# Patient Record
Sex: FEMALE | Race: WHITE | NOT HISPANIC OR LATINO | Employment: OTHER | URBAN - METROPOLITAN AREA
[De-identification: names, ages, dates, MRNs, and addresses within clinical notes are randomized per-mention and may not be internally consistent; named-entity substitution may affect disease eponyms.]

---

## 2021-01-21 ENCOUNTER — IMMUNIZATIONS (OUTPATIENT)
Dept: FAMILY MEDICINE CLINIC | Facility: HOSPITAL | Age: 76
End: 2021-01-21

## 2021-01-21 DIAGNOSIS — Z23 ENCOUNTER FOR IMMUNIZATION: Primary | ICD-10-CM

## 2021-01-21 PROCEDURE — 91301 SARS-COV-2 / COVID-19 MRNA VACCINE (MODERNA) 100 MCG: CPT

## 2021-01-21 PROCEDURE — 0011A SARS-COV-2 / COVID-19 MRNA VACCINE (MODERNA) 100 MCG: CPT

## 2021-02-17 ENCOUNTER — IMMUNIZATIONS (OUTPATIENT)
Dept: FAMILY MEDICINE CLINIC | Facility: HOSPITAL | Age: 76
End: 2021-02-17

## 2021-02-17 DIAGNOSIS — Z23 ENCOUNTER FOR IMMUNIZATION: Primary | ICD-10-CM

## 2021-02-17 PROCEDURE — 91301 SARS-COV-2 / COVID-19 MRNA VACCINE (MODERNA) 100 MCG: CPT

## 2021-02-17 PROCEDURE — 0012A SARS-COV-2 / COVID-19 MRNA VACCINE (MODERNA) 100 MCG: CPT

## 2021-08-03 ENCOUNTER — OFFICE VISIT (OUTPATIENT)
Dept: FAMILY MEDICINE CLINIC | Facility: CLINIC | Age: 76
End: 2021-08-03
Payer: MEDICARE

## 2021-08-03 VITALS
HEART RATE: 88 BPM | TEMPERATURE: 97.8 F | BODY MASS INDEX: 20.66 KG/M2 | RESPIRATION RATE: 20 BRPM | SYSTOLIC BLOOD PRESSURE: 138 MMHG | OXYGEN SATURATION: 98 % | WEIGHT: 121 LBS | DIASTOLIC BLOOD PRESSURE: 88 MMHG | HEIGHT: 64 IN

## 2021-08-03 DIAGNOSIS — R03.0 ELEVATED BLOOD-PRESSURE READING WITHOUT DIAGNOSIS OF HYPERTENSION: ICD-10-CM

## 2021-08-03 DIAGNOSIS — Z13.6 SCREENING FOR CARDIOVASCULAR CONDITION: ICD-10-CM

## 2021-08-03 DIAGNOSIS — H54.62 VISION LOSS OF LEFT EYE: Primary | ICD-10-CM

## 2021-08-03 DIAGNOSIS — D22.9 ATYPICAL NEVUS: ICD-10-CM

## 2021-08-03 PROCEDURE — 99204 OFFICE O/P NEW MOD 45 MIN: CPT | Performed by: INTERNAL MEDICINE

## 2021-08-03 NOTE — PROGRESS NOTES
[facility]        NAME: Maxi Levy is a 67 y o  male  : 1945    MRN: [id number]  DATE: August 3, 2021  TIME: 1:43 PM    Assessment and Plan   1  Vision loss of left eye  -difficult to visualize fundi due to pupillary constriction, she does have visual field deficit in left eye, nasal field  Otherwise normal neurological exam  Recommend she sees Ophtho ASAP for dilated eye exam    -     Ambulatory referral to Ophthalmology; Future    2  Elevated blood-pressure reading without diagnosis of hypertension  -BP a little high today, she denies prior history of HTN but hasn't not seen PCP in many years  -    CBC and differential; Future  -     Comprehensive metabolic panel; Future    3  Screening for cardiovascular condition  -     Lipid panel; Future    4  Atypical nevus  - mole on nose has been biopsied and removed in the past, but hasn't seen dermatology in 3 years  There is some areas of color irregularity, recommend she follows up with Dermatologist           Tobacco Cessation Counseling: Tobacco cessation counseling was provided  The patient is sincerely urged to quit consumption of tobacco  He is not ready to quit tobacco        Chief Complaint     Chief Complaint   Patient presents with   [address] issue did get dizzy,         History of Present Illness       Here to establish care  Hasn't seen PCP in years  Main concern is her vision  Reports "hole or black spot" visual field of left eye  No eye pain or photophobia  Started about 2 years ago  Has never had colon cancer screening, last mammogram several years ago  Sister  from brain cancer  Social hx: current smoker, < 0 5 ppd, previously ~ 0 5 ppd for 40 years  Cares for her  who has cancer  Review of Systems   Review of Systems   Constitutional: Negative for chills, fatigue, fever and unexpected weight change  HENT: Positive for hearing loss   Negative for congestion, postnasal drip, rhinorrhea, sore throat and trouble swallowing  Eyes: Positive for visual disturbance  Negative for photophobia, pain, redness and itching  Respiratory: Negative for cough, chest tightness, shortness of breath and wheezing  Cardiovascular: Negative for chest pain, palpitations and leg swelling  Gastrointestinal: Negative for abdominal pain, blood in stool, constipation, diarrhea, nausea and vomiting  Endocrine: Negative for cold intolerance, heat intolerance, polydipsia and polyuria  Genitourinary: Negative for dysuria, frequency, hematuria and urgency  Musculoskeletal: Negative for arthralgias, back pain and joint swelling  Skin: Negative for rash  Neurological: Negative for dizziness, tremors, speech difficulty, weakness, light-headedness, numbness and headaches  Psychiatric/Behavioral: Negative for confusion, dysphoric mood, hallucinations and suicidal ideas  The patient is not nervous/anxious  Current Medications     No current outpatient medications on file  Current Allergies     Allergies as of 08/03/2021    (No Known Allergies)            The following portions of the patient's history were reviewed and updated as appropriate: allergies, current medications, past family history, past medical history, past social history, past surgical history and problem list      History reviewed  No pertinent past medical history  Past Surgical History:   Procedure Laterality Date    APPENDECTOMY      CHOLECYSTECTOMY         Family History   Problem Relation Age of Onset    Diabetes Sister     Cancer Sister         brain cancer         Medications have been verified  Objective   /88   Pulse 88   Temp 97 8 °F (36 6 °C) (Tympanic)   Resp 20   Ht 5' 4" (1 626 m)   Wt 54 9 kg (121 lb)   SpO2 98%   BMI 20 77 kg/m²        Physical Exam     Physical Exam  Vitals reviewed  Constitutional:       General: He is not in acute distress       Appearance: He is well-developed and normal weight  HENT:      Head: Normocephalic and atraumatic  Right Ear: Ear canal and external ear normal  There is impacted cerumen  Left Ear: Ear canal and external ear normal  There is impacted cerumen  Mouth/Throat:      Pharynx: No oropharyngeal exudate or posterior oropharyngeal erythema  Eyes:      General: Visual field deficit present  No scleral icterus  Extraocular Movements: Extraocular movements intact  Conjunctiva/sclera: Conjunctivae normal       Pupils: Pupils are equal, round, and reactive to light  Funduscopic exam:     Right eye: No hemorrhage or papilledema  Visual Fields: Right eye visual fields normal       Comments: Difficulty visualizing left fundi   Neck:      Thyroid: No thyromegaly  Vascular: No carotid bruit  Cardiovascular:      Rate and Rhythm: Normal rate and regular rhythm  Pulses:           Dorsalis pedis pulses are 2+ on the right side and 2+ on the left side  Posterior tibial pulses are 2+ on the right side and 2+ on the left side  Heart sounds: Murmur heard  No friction rub  No gallop  Comments: Grade II/VI JONY at [employer]  Pulmonary:      Effort: Pulmonary effort is normal  No respiratory distress  Breath sounds: Normal breath sounds  No wheezing, rhonchi or rales  Chest:      Chest wall: No tenderness  Abdominal:      General: Abdomen is flat  A surgical scar is present  Bowel sounds are normal  There is no distension  Palpations: Abdomen is soft  There is no mass  Tenderness: There is no abdominal tenderness  There is no guarding or rebound  Hernia: No hernia is present  Musculoskeletal:         General: No tenderness or deformity  Normal range of motion  Cervical back: Normal range of motion and neck supple  Right lower leg: No edema  Left lower leg: No edema     Feet:      Right foot:      Skin integrity: No ulcer, skin breakdown, erythema, warmth, callus or dry skin  Left foot:      Skin integrity: No ulcer, skin breakdown, erythema, warmth, callus or dry skin  Lymphadenopathy:      Cervical: No cervical adenopathy  Skin:     General: Skin is warm and dry  Capillary Refill: Capillary refill takes less than 2 seconds  Comments: Nevus of tip of nose with some discoloration, hyperpigmented areas   Neurological:      Mental Status: He is alert and oriented to person, place, and time  Cranial Nerves: No dysarthria or facial asymmetry  Motor: Motor function is intact  No weakness or tremor  Coordination: Coordination is intact  Gait: Gait is intact  Psychiatric:         Mood and Affect: Mood and affect normal          Speech: Speech normal          Behavior: Behavior normal  Behavior is cooperative  Thought Content:  Thought content normal          Judgment: Judgment normal              Results:  No results found for: SODIUM, K, CL, CO2, BUN, CREATININE, GLUC, CALCIUM    No results found for: HGBA1C    No results found for: WBC, HGB, HCT, MCV, PLT

## 2021-08-03 NOTE — PATIENT INSTRUCTIONS
Please see Dr Edna Barboza for vision loss in left eye  Please get blood work done  Please call dermatologist at [facility] for mole on your nose  Follow up in 6 months

## 2021-08-04 ENCOUNTER — APPOINTMENT (OUTPATIENT)
Dept: LAB | Facility: CLINIC | Age: 76
End: 2021-08-04
Payer: MEDICARE

## 2021-08-04 DIAGNOSIS — Z13.6 SCREENING FOR CARDIOVASCULAR CONDITION: ICD-10-CM

## 2021-08-04 DIAGNOSIS — R03.0 ELEVATED BLOOD-PRESSURE READING WITHOUT DIAGNOSIS OF HYPERTENSION: ICD-10-CM

## 2021-08-04 LAB
ALBUMIN SERPL BCP-MCNC: 3.7 G/DL (ref 3.5–5)
ALP SERPL-CCNC: 98 U/L (ref 46–116)
ALT SERPL W P-5'-P-CCNC: 14 U/L (ref 12–78)
ANION GAP SERPL CALCULATED.3IONS-SCNC: 5 MMOL/L (ref 4–13)
AST SERPL W P-5'-P-CCNC: 13 U/L (ref 5–45)
BASOPHILS # BLD AUTO: 0.04 THOUSANDS/ΜL (ref 0–0.1)
BASOPHILS NFR BLD AUTO: 1 % (ref 0–1)
BILIRUB SERPL-MCNC: 0.47 MG/DL (ref 0.2–1)
BUN SERPL-MCNC: 13 MG/DL (ref 5–25)
CALCIUM SERPL-MCNC: 9 MG/DL (ref 8.3–10.1)
CHLORIDE SERPL-SCNC: 104 MMOL/L (ref 100–108)
CHOLEST SERPL-MCNC: 226 MG/DL (ref 50–200)
CO2 SERPL-SCNC: 28 MMOL/L (ref 21–32)
CREAT SERPL-MCNC: 0.86 MG/DL (ref 0.6–1.3)
EOSINOPHIL # BLD AUTO: 0.17 THOUSAND/ΜL (ref 0–0.61)
EOSINOPHIL NFR BLD AUTO: 3 % (ref 0–6)
ERYTHROCYTE [DISTWIDTH] IN BLOOD BY AUTOMATED COUNT: 14.4 % (ref 11.6–15.1)
GFR SERPL CREATININE-BSD FRML MDRD: 84 ML/MIN/1.73SQ M
GLUCOSE P FAST SERPL-MCNC: 94 MG/DL (ref 65–99)
HCT VFR BLD AUTO: 40.2 % (ref 36.5–49.3)
HDLC SERPL-MCNC: 51 MG/DL
HGB BLD-MCNC: 12.5 G/DL (ref 12–17)
IMM GRANULOCYTES # BLD AUTO: 0.01 THOUSAND/UL (ref 0–0.2)
IMM GRANULOCYTES NFR BLD AUTO: 0 % (ref 0–2)
LDLC SERPL CALC-MCNC: 155 MG/DL (ref 0–100)
LYMPHOCYTES # BLD AUTO: 1.34 THOUSANDS/ΜL (ref 0.6–4.47)
LYMPHOCYTES NFR BLD AUTO: 22 % (ref 14–44)
MCH RBC QN AUTO: 28.3 PG (ref 26.8–34.3)
MCHC RBC AUTO-ENTMCNC: 31.1 G/DL (ref 31.4–37.4)
MCV RBC AUTO: 91 FL (ref 82–98)
MONOCYTES # BLD AUTO: 0.36 THOUSAND/ΜL (ref 0.17–1.22)
MONOCYTES NFR BLD AUTO: 6 % (ref 4–12)
NEUTROPHILS # BLD AUTO: 4.11 THOUSANDS/ΜL (ref 1.85–7.62)
NEUTS SEG NFR BLD AUTO: 68 % (ref 43–75)
NONHDLC SERPL-MCNC: 175 MG/DL
NRBC BLD AUTO-RTO: 0 /100 WBCS
PLATELET # BLD AUTO: 193 THOUSANDS/UL (ref 149–390)
PMV BLD AUTO: 10.5 FL (ref 8.9–12.7)
POTASSIUM SERPL-SCNC: 4.1 MMOL/L (ref 3.5–5.3)
PROT SERPL-MCNC: 7.6 G/DL (ref 6.4–8.2)
RBC # BLD AUTO: 4.42 MILLION/UL (ref 3.88–5.62)
SODIUM SERPL-SCNC: 137 MMOL/L (ref 136–145)
TRIGL SERPL-MCNC: 101 MG/DL
WBC # BLD AUTO: 6.03 THOUSAND/UL (ref 4.31–10.16)

## 2021-08-04 PROCEDURE — 85025 COMPLETE CBC W/AUTO DIFF WBC: CPT

## 2021-08-04 PROCEDURE — 80061 LIPID PANEL: CPT

## 2021-08-04 PROCEDURE — 36415 COLL VENOUS BLD VENIPUNCTURE: CPT

## 2021-08-04 PROCEDURE — 80053 COMPREHEN METABOLIC PANEL: CPT

## 2021-08-05 DIAGNOSIS — E78.00 ELEVATED CHOLESTEROL: Primary | ICD-10-CM

## 2021-08-06 RX ORDER — ATORVASTATIN CALCIUM 10 MG/1
10 TABLET, FILM COATED ORAL EVERY EVENING
Qty: 90 TABLET | Refills: 0 | Status: SHIPPED | OUTPATIENT
Start: 2021-08-06 | End: 2021-09-30 | Stop reason: SDUPTHER

## 2021-09-16 ENCOUNTER — APPOINTMENT (OUTPATIENT)
Dept: LAB | Facility: CLINIC | Age: 76
End: 2021-09-16
Payer: MEDICARE

## 2021-09-16 ENCOUNTER — OFFICE VISIT (OUTPATIENT)
Dept: FAMILY MEDICINE CLINIC | Facility: CLINIC | Age: 76
End: 2021-09-16
Payer: MEDICARE

## 2021-09-16 VITALS
DIASTOLIC BLOOD PRESSURE: 86 MMHG | SYSTOLIC BLOOD PRESSURE: 134 MMHG | TEMPERATURE: 98.2 F | HEIGHT: 64 IN | WEIGHT: 121 LBS | RESPIRATION RATE: 20 BRPM | HEART RATE: 79 BPM | OXYGEN SATURATION: 98 % | BODY MASS INDEX: 20.66 KG/M2

## 2021-09-16 DIAGNOSIS — R68.89 COLD INTOLERANCE: ICD-10-CM

## 2021-09-16 DIAGNOSIS — R26.2 AMBULATORY DYSFUNCTION: ICD-10-CM

## 2021-09-16 DIAGNOSIS — R41.3 MEMORY LOSS: ICD-10-CM

## 2021-09-16 DIAGNOSIS — E55.9 VITAMIN D DEFICIENCY, UNSPECIFIED: ICD-10-CM

## 2021-09-16 DIAGNOSIS — R41.3 MEMORY LOSS: Primary | ICD-10-CM

## 2021-09-16 LAB
25(OH)D3 SERPL-MCNC: 11.5 NG/ML (ref 30–100)
T4 FREE SERPL-MCNC: 0.83 NG/DL (ref 0.76–1.46)
TSH SERPL DL<=0.05 MIU/L-ACNC: 8.46 UIU/ML (ref 0.36–3.74)
VIT B12 SERPL-MCNC: 416 PG/ML (ref 100–900)

## 2021-09-16 PROCEDURE — 84443 ASSAY THYROID STIM HORMONE: CPT

## 2021-09-16 PROCEDURE — 82607 VITAMIN B-12: CPT

## 2021-09-16 PROCEDURE — 99214 OFFICE O/P EST MOD 30 MIN: CPT | Performed by: INTERNAL MEDICINE

## 2021-09-16 PROCEDURE — 84439 ASSAY OF FREE THYROXINE: CPT

## 2021-09-16 PROCEDURE — 82306 VITAMIN D 25 HYDROXY: CPT

## 2021-09-16 PROCEDURE — 36415 COLL VENOUS BLD VENIPUNCTURE: CPT

## 2021-09-16 NOTE — PROGRESS NOTES
[facility]        NAME: Wilmer Mcgrath is a 67 y o  female  : 1945    MRN: [id number]  DATE: 2021  TIME: 7:30 PM    Assessment and Plan   1  Memory loss  -MMSE 26 today, she struggled with 3 item recall, differential includes dementia vs MCI  Will check labs, brain MRI due to unsteady gait  Discussed trial of donepezil, follow up 1 month  -  TSH, 3rd generation with Free T4 reflex; Future  -     Vitamin D 25 hydroxy; Future  -     Vitamin B12; Future  -     MRI brain wo contrast; Future; Expected date: 2021    2  Cold intolerance  -     TSH, 3rd generation with Free T4 reflex; Future    3  Vitamin D deficiency, unspecified   -     Vitamin D 25 hydroxy; Future    4  Ambulatory dysfunction  -     MRI brain wo contrast; Future; Expected date: 2021             Chief Complaint     Chief Complaint   Patient presents with    loss of balance     no falls/ dizziness and lightneadedness, blurry vision - did see eye doctor and got new glasses which she states is helping    Memory Loss     pt states she is getting forgetful         History of Present Illness       Here for acute visit due to confusion/memory loss   is with her today  Started about a year ago, but progressive  Often forgets where they park, but recently was shopping alone in grocery store and could not find certain items, checkout (she's been there several times before)  Niece is also concerned  Pt notices that she has trouble remembering things, feels bothered by it  Denies depression, anxiety, hallucinations but  reports that she often talks to herself  Denies headaches, tremors or dizziness but does feel off-balance at times, denies falls   manages finances, able to dress/bathe and feed self  Does housekeeping without difficulty  Review of Systems   Review of Systems   Constitutional: Positive for fatigue and unexpected weight change (20 lbs in past year)   Negative for appetite change, chills and fever  HENT: Negative for hearing loss and tinnitus  Eyes: Negative for visual disturbance  Gastrointestinal: Negative for abdominal pain, constipation, diarrhea, nausea and vomiting  Endocrine: Positive for cold intolerance  Genitourinary: Negative for difficulty urinating  Neurological: Positive for dizziness (feels off balance)  Negative for tremors, weakness, light-headedness, numbness and headaches  Psychiatric/Behavioral: Negative for hallucinations and sleep disturbance  The patient is not nervous/anxious  Current Medications       Current Outpatient Medications:     atorvastatin (LIPITOR) 10 mg tablet, Take 1 tablet (10 mg total) by mouth every evening, Disp: 90 tablet, Rfl: 0    ergocalciferol (VITAMIN D2) 50,000 units, Take 1 capsule (50,000 Units total) by mouth once a week, Disp: 12 capsule, Rfl: 1    levothyroxine 50 mcg tablet, Take 1 tablet (50 mcg total) by mouth daily in the early morning, Disp: 30 tablet, Rfl: 1    Current Allergies     Allergies as of 09/16/2021    (No Known Allergies)            The following portions of the patient's history were reviewed and updated as appropriate: allergies, current medications, past family history, past medical history, past social history, past surgical history and problem list      History reviewed  No pertinent past medical history  Past Surgical History:   Procedure Laterality Date    APPENDECTOMY      CHOLECYSTECTOMY         Family History   Problem Relation Age of Onset    Diabetes Sister     Cancer Sister         brain cancer         Medications have been verified  Objective   /86   Pulse 79   Temp 98 2 °F (36 8 °C)   Resp 20   Ht 5' 4" (1 626 m)   Wt 54 9 kg (121 lb)   SpO2 98%   BMI 20 77 kg/m²        Physical Exam     Physical Exam  Vitals reviewed  Constitutional:       General: She is not in acute distress  Appearance: She is normal weight     Neck: Thyroid: No thyromegaly  Cardiovascular:      Rate and Rhythm: Normal rate and regular rhythm  Heart sounds: S1 normal and S2 normal  Murmur heard  No friction rub  No gallop  Pulmonary:      Effort: Pulmonary effort is normal  No accessory muscle usage  Breath sounds: Normal breath sounds  No wheezing, rhonchi or rales  Musculoskeletal:      Right lower leg: No edema  Left lower leg: No edema  Neurological:      Mental Status: She is alert and oriented to person, place, and time  Motor: Motor function is intact  No weakness, tremor or abnormal muscle tone  Coordination: Romberg sign negative  Gait: Tandem walk abnormal    Psychiatric:         Mood and Affect: Mood and affect normal          Speech: Speech normal          Behavior: Behavior normal  Behavior is cooperative  Thought Content:  Thought content normal          Judgment: Judgment normal       Comments: MMSE 26/30               Results:  Lab Results   Component Value Date    SODIUM 137 08/04/2021    K 4 1 08/04/2021     08/04/2021    CO2 28 08/04/2021    BUN 13 08/04/2021    CREATININE 0 86 08/04/2021    CALCIUM 9 0 08/04/2021       No results found for: HGBA1C    Lab Results   Component Value Date    WBC 6 03 08/04/2021    HGB 12 5 08/04/2021    HCT 40 2 08/04/2021    MCV 91 08/04/2021     08/04/2021

## 2021-09-16 NOTE — PATIENT INSTRUCTIONS
Please get blood work done and MRI brain  If normal, we will start donepezil for possible dementia  Follow up in 1 month to re-assess

## 2021-09-17 DIAGNOSIS — E03.9 HYPOTHYROIDISM, UNSPECIFIED TYPE: Primary | ICD-10-CM

## 2021-09-17 DIAGNOSIS — E55.9 VITAMIN D DEFICIENCY: ICD-10-CM

## 2021-09-17 RX ORDER — ERGOCALCIFEROL 1.25 MG/1
50000 CAPSULE ORAL WEEKLY
Qty: 12 CAPSULE | Refills: 1 | Status: SHIPPED | OUTPATIENT
Start: 2021-09-17 | End: 2022-02-14 | Stop reason: SDUPTHER

## 2021-09-17 RX ORDER — LEVOTHYROXINE SODIUM 0.05 MG/1
50 TABLET ORAL
Qty: 30 TABLET | Refills: 1 | Status: SHIPPED | OUTPATIENT
Start: 2021-09-17 | End: 2021-11-11 | Stop reason: SDUPTHER

## 2021-09-27 ENCOUNTER — HOSPITAL ENCOUNTER (OUTPATIENT)
Dept: MRI IMAGING | Facility: HOSPITAL | Age: 76
Discharge: HOME | End: 2021-09-27
Attending: INTERNAL MEDICINE
Payer: MEDICARE

## 2021-09-27 DIAGNOSIS — R26.2 AMBULATORY DYSFUNCTION: ICD-10-CM

## 2021-09-27 DIAGNOSIS — R41.3 MEMORY LOSS: ICD-10-CM

## 2021-09-27 PROCEDURE — G1004 CDSM NDSC: HCPCS

## 2021-09-27 PROCEDURE — 70551 MRI BRAIN STEM W/O DYE: CPT

## 2021-09-30 ENCOUNTER — TELEPHONE (OUTPATIENT)
Dept: NEUROLOGY | Facility: CLINIC | Age: 76
End: 2021-09-30

## 2021-09-30 NOTE — TELEPHONE ENCOUNTER
Minnie Cooley had [facility] asking pt if she is okay coming in at 1pm instead  She called back to say that is okay  Removed NM appt notes for change  Gave directions and address

## 2021-09-30 NOTE — TELEPHONE ENCOUNTER
Scheduled via Paul Szymanski at [facility] office    Best contact number for patient:     verified by PCP        Emergency Contact name and number:    Referring provider and telephone number:     PCP    Primary Care Provider Name and if affiliated with [facility]:        Nolvia Juanito    Reason for Appointment/Dx:  Cerebrovascular accident (CVA), unspecified mechanism ([facility] )  R41 3 (ICD-10-CM) - Memory loss  R26 2 (ICD-10-CM) - Ambulatory dysfunction    Have you seen and followed up with a pediatric Neurologist for this disease in the past?      NO(If yes ok to schedule with Dr Stevan Escalante)    Neurology Location patient would like to be seen:    Order received? Yes                                              Records Received? PCP notes    Have you ever seen another Neurologist?       Not sure    Insurance Information    Insurance Name:    ID/Policy #:    Secondary Insurance:    ID/Policy#: Workman's Comp/ Accident/ School  Information      Workman's Comp/Accident/School related?        NO    If yes name of Insurance company:    Claim #:    Date of Injury:    Type of Injury:     Name and Telephone Number:    Notes:                   Appointment date:   TOMORROW for ASAP ref  Friday  10/1/21  [facility]

## 2021-10-01 ENCOUNTER — APPOINTMENT (OUTPATIENT)
Dept: LAB | Facility: CLINIC | Age: 76
End: 2021-10-01
Payer: MEDICARE

## 2021-10-01 ENCOUNTER — CONSULT (OUTPATIENT)
Dept: NEUROLOGY | Facility: CLINIC | Age: 76
End: 2021-10-01
Payer: MEDICARE

## 2021-10-01 ENCOUNTER — TELEPHONE (OUTPATIENT)
Dept: NEUROLOGY | Facility: CLINIC | Age: 76
End: 2021-10-01

## 2021-10-01 VITALS
BODY MASS INDEX: 21.04 KG/M2 | HEART RATE: 82 BPM | WEIGHT: 122.6 LBS | SYSTOLIC BLOOD PRESSURE: 180 MMHG | DIASTOLIC BLOOD PRESSURE: 90 MMHG

## 2021-10-01 DIAGNOSIS — Z86.73 HISTORY OF STROKE: ICD-10-CM

## 2021-10-01 DIAGNOSIS — R41.3 MEMORY LOSS: ICD-10-CM

## 2021-10-01 DIAGNOSIS — R26.2 AMBULATORY DYSFUNCTION: ICD-10-CM

## 2021-10-01 DIAGNOSIS — Z86.73 HISTORY OF STROKE: Primary | ICD-10-CM

## 2021-10-01 PROBLEM — H53.462: Status: ACTIVE | Noted: 2021-10-01

## 2021-10-01 PROBLEM — I63.9 CEREBROVASCULAR ACCIDENT (CVA) (HCC): Status: ACTIVE | Noted: 2021-10-01

## 2021-10-01 LAB
BUN SERPL-MCNC: 14 MG/DL (ref 5–25)
CREAT SERPL-MCNC: 0.89 MG/DL (ref 0.6–1.3)
GFR SERPL CREATININE-BSD FRML MDRD: 63 ML/MIN/1.73SQ M

## 2021-10-01 PROCEDURE — 99213 OFFICE O/P EST LOW 20 MIN: CPT | Performed by: PSYCHIATRY & NEUROLOGY

## 2021-10-01 PROCEDURE — 84520 ASSAY OF UREA NITROGEN: CPT

## 2021-10-01 PROCEDURE — 82565 ASSAY OF CREATININE: CPT

## 2021-10-01 PROCEDURE — 36415 COLL VENOUS BLD VENIPUNCTURE: CPT

## 2021-10-01 NOTE — ASSESSMENT & PLAN NOTE
Pt reports trouble with remembering where she places things, this appears to be more along the lines of a distractibility issue   MMSE at PCP recently was 26/30, normal

## 2021-10-01 NOTE — PROGRESS NOTES
Patient ID: Irving Shen is a 67 y o  female  Assessment/Plan:    Quadrant anopia, left  Pt reports blurry vision in left eye, exam found to have a left inferior nasal quadrant anopia  MRI showed an old infarct within the left posterior lateral temporal lobe and lateral occipital lobe which is the most likely cause  Memory loss  Pt reports trouble with remembering where she places things, this appears to be more along the lines of a distractibility issue  MMSE at [facility] recently was 26/30, normal     Ambulatory dysfunction  Pt denied any ambulatory issues today on exam     Cerebrovascular accident (CVA) [facility])  In summary, Irving Shen is a 67 y o   female with a history of HLD, and suspected HTN who presented with abnormal MRI findings of an old stroke and has an exam notable for left inferior nasal quadrant anopia  MRI 9/27/2021 revealed an "old infarct within the left posterior lateral temporal lobe and lateral occipital lobe  There is mild hyperintense diffusion signal seen within the peripheral aspect of this old infarct, some of which appears restricted which may represent acute to subacute price-infarct ischemia " Lipid Profile 8/4/2021: Total Cholesterol 226 / Triglycerides 101 / HDL 51 /   - The patient has significant risk factors for stroke including HTN and HLD  - The cause of her stroke seen on MRI is not clear, could be due to HTN, or atherosclerotic vs emboli, will need to obtain further work up to determine a possible cause  - Will obtain CTA  - Continue ASA and Atorvastatin        Diagnoses and all orders for this visit:    History of stroke  -     Ambulatory referral to Neurology  -     CTA head and neck w wo contrast; Future  -     Creatinine, serum;  Future  -     BUN; Future    Memory loss  -     Ambulatory referral to Neurology    Ambulatory dysfunction  -     Ambulatory referral to Neurology           Subjective:    Irving Shen is a 67 y o  female with PMHx of HTN, and hypothyroidism, who presents today at the request of her PCP following an MRI on Monday that showed a stroke that was initially obtained due to concerns for Dementia  Specifically she reports she has felt unsteadiness  She reports some memory and cognitive issues such as not remembering where she placed something which is a newer issue for her in the last 3 or so month  Does report some visual changes that started 6-8 months ago  Saw Dr Luis F dominguez in [place]  The patient does not drive anymore as her  took away her keys because of her vision issues  In her left eye, she has blurriness,     The following portions of the patient's history were reviewed and updated as appropriate: allergies, current medications, past family history, past medical history, past social history, past surgical history and problem list     Objective:    Blood pressure (!) 180/90, pulse 82, weight 55 6 kg (122 lb 9 6 oz)  Physical Exam  Vitals and nursing note reviewed  Constitutional:       General: She is not in acute distress  Appearance: She is not ill-appearing, toxic-appearing or diaphoretic  HENT:      Head: Normocephalic and atraumatic  Nose: Nose normal       Mouth/Throat:      Mouth: Mucous membranes are moist       Pharynx: Oropharynx is clear  No oropharyngeal exudate  Eyes:      General: Lids are normal  No scleral icterus  Right eye: No discharge  Left eye: No discharge  Extraocular Movements: Extraocular movements intact  Pupils: Pupils are equal, round, and reactive to light  Cardiovascular:      Rate and Rhythm: Normal rate  Pulses: Normal pulses  Pulmonary:      Effort: Pulmonary effort is normal    Abdominal:      General: Abdomen is flat  Musculoskeletal:         General: No swelling or deformity  Skin:     General: Skin is warm and dry  Neurological:      Mental Status: She is alert     Psychiatric:         Mood and Affect: Mood normal          Speech: Speech normal          Behavior: Behavior normal          Neurological Exam  Mental Status  Alert  Recent and remote memory are intact  Speech is normal  Language is fluent with no aphasia  Attention and concentration are normal     Cranial Nerves  CN II: Visual acuity is normal  Left inferior quadrantanopsia  Inferior nasal quadrantanopsia  CN III, IV, VI: Extraocular movements intact bilaterally  Normal lids and orbits bilaterally  Pupils equal round and reactive to light bilaterally  CN V: Facial sensation is normal   CN VII: Full and symmetric facial movement  CN VIII: Hearing is normal   CN IX, X: Palate elevates symmetrically  Normal gag reflex  CN XI: Shoulder shrug strength is normal   CN XII: Tongue midline without atrophy or fasciculations  Motor  Normal muscle bulk throughout  Normal muscle tone  No abnormal involuntary movements  Strength is 5/5 in all four extremities except as noted  Sensory  Sensation is intact to light touch, pinprick, vibration and proprioception in all four extremities  Reflexes  Deep tendon reflexes are 2+ and symmetric except as noted  Coordination  Right: Finger-to-nose normal  Heel-to-shin normal   Left: Finger-to-nose normal  Heel-to-shin normal     ROS reviewed and as per below  ROS:    Review of Systems   Constitutional: Negative  Negative for appetite change and fever  HENT: Negative  Negative for hearing loss, tinnitus, trouble swallowing and voice change  Eyes: Negative  Negative for photophobia and pain  Respiratory: Negative  Negative for shortness of breath  Cardiovascular: Negative  Negative for palpitations  Gastrointestinal: Negative  Negative for nausea and vomiting  Endocrine: Negative  Negative for cold intolerance  Genitourinary: Negative  Negative for dysuria, frequency and urgency  Musculoskeletal: Negative  Negative for myalgias and neck pain  Skin: Negative  Negative for rash  Neurological: Negative  Negative for dizziness, tremors, seizures, syncope, facial asymmetry, speech difficulty, weakness, light-headedness, numbness and headaches  Hematological: Negative  Does not bruise/bleed easily  Psychiatric/Behavioral: Positive for confusion  Negative for hallucinations and sleep disturbance  Memory loss   All other systems reviewed and are negative

## 2021-10-01 NOTE — ASSESSMENT & PLAN NOTE
Pt reports blurry vision in left eye, exam found to have a left inferior nasal quadrant anopia  MRI showed an old infarct within the left posterior lateral temporal lobe and lateral occipital lobe which is the most likely cause

## 2021-10-01 NOTE — PATIENT INSTRUCTIONS
- Please obtain the CT (brain imaging) of your head  - Please monitor your blood pressure and keep a record of these readings  - Please follow up with your primary care provider regarding your blood pressure, if it frequently higher then 130/80 please call your PCP

## 2021-10-01 NOTE — ASSESSMENT & PLAN NOTE
In summary, Geni Egan is a 67 y o   female with a history of HLD, and suspected HTN who presented with abnormal MRI findings of an old stroke and has an exam notable for left inferior nasal quadrant anopia  MRI 9/27/2021 revealed an "old infarct within the left posterior lateral temporal lobe and lateral occipital lobe  There is mild hyperintense diffusion signal seen within the peripheral aspect of this old infarct, some of which appears restricted which may represent acute to subacute price-infarct ischemia " Lipid Profile 8/4/2021: Total Cholesterol 226 / Triglycerides 101 / HDL 51 /   - The patient has significant risk factors for stroke including HTN and HLD  - The cause of her stroke seen on MRI is not clear, could be due to HTN, or atherosclerotic vs emboli, will need to obtain further work up to determine a possible cause     - Will obtain CTA  - Continue ASA and Atorvastatin

## 2021-10-05 ENCOUNTER — TELEPHONE (OUTPATIENT)
Dept: FAMILY MEDICINE CLINIC | Facility: CLINIC | Age: 76
End: 2021-10-05

## 2021-10-06 ENCOUNTER — CLINICAL SUPPORT (OUTPATIENT)
Dept: FAMILY MEDICINE CLINIC | Facility: CLINIC | Age: 76
End: 2021-10-06
Payer: MEDICARE

## 2021-10-06 ENCOUNTER — TELEPHONE (OUTPATIENT)
Dept: FAMILY MEDICINE CLINIC | Facility: CLINIC | Age: 76
End: 2021-10-06

## 2021-10-06 VITALS
OXYGEN SATURATION: 96 % | SYSTOLIC BLOOD PRESSURE: 186 MMHG | RESPIRATION RATE: 20 BRPM | DIASTOLIC BLOOD PRESSURE: 84 MMHG | HEART RATE: 84 BPM

## 2021-10-06 DIAGNOSIS — I10 PRIMARY HYPERTENSION: Primary | ICD-10-CM

## 2021-10-06 PROCEDURE — 99211 OFF/OP EST MAY X REQ PHY/QHP: CPT

## 2021-10-06 RX ORDER — AMLODIPINE BESYLATE 5 MG/1
5 TABLET ORAL DAILY
Qty: 30 TABLET | Refills: 1 | Status: SHIPPED | OUTPATIENT
Start: 2021-10-06 | End: 2021-11-16 | Stop reason: SDUPTHER

## 2021-10-08 ENCOUNTER — HOSPITAL ENCOUNTER (OUTPATIENT)
Dept: CT IMAGING | Facility: HOSPITAL | Age: 76
Discharge: HOME | End: 2021-10-08
Attending: PSYCHIATRY & NEUROLOGY
Payer: MEDICARE

## 2021-10-08 DIAGNOSIS — Z86.73 HISTORY OF STROKE: ICD-10-CM

## 2021-10-08 PROCEDURE — 70498 CT ANGIOGRAPHY NECK: CPT

## 2021-10-08 PROCEDURE — 70496 CT ANGIOGRAPHY HEAD: CPT

## 2021-10-08 RX ADMIN — IOHEXOL 100 ML: 350 INJECTION, SOLUTION INTRAVENOUS at 15:10

## 2021-10-14 ENCOUNTER — HOSPITAL ENCOUNTER (OUTPATIENT)
Dept: NON INVASIVE DIAGNOSTICS | Facility: HOSPITAL | Age: 76
Discharge: HOME | End: 2021-10-14
Attending: INTERNAL MEDICINE
Payer: MEDICARE

## 2021-10-14 VITALS
DIASTOLIC BLOOD PRESSURE: 84 MMHG | WEIGHT: 122 LBS | HEIGHT: 64 IN | SYSTOLIC BLOOD PRESSURE: 186 MMHG | BODY MASS INDEX: 20.83 KG/M2

## 2021-10-14 DIAGNOSIS — I67.2 CEREBRAL ATHEROSCLEROSIS: ICD-10-CM

## 2021-10-14 DIAGNOSIS — I63.9 CEREBROVASCULAR ACCIDENT (CVA), UNSPECIFIED MECHANISM (HCC): ICD-10-CM

## 2021-10-14 LAB
AORTIC ROOT: 3.3 CM
AORTIC VALVE MEAN VELOCITY: 11.3 M/S
APICAL FOUR CHAMBER EJECTION FRACTION: 65 %
ASCENDING AORTA: 2.8 CM
AV LVOT MEAN GRADIENT: 2 MMHG
AV LVOT PEAK GRADIENT: 4 MMHG
AV MEAN GRADIENT: 6 MMHG
AV PEAK GRADIENT: 10 MMHG
DOP CALC AO VTI: 38.61 CM
DOP CALC LVOT PEAK VEL VTI: 19.65 CM
DOP CALC LVOT PEAK VEL: 0.95 M/S
E WAVE DECELERATION TIME: 233 MS
FRACTIONAL SHORTENING: 43 % (ref 28–44)
INTERVENTRICULAR SEPTUM IN DIASTOLE (PARASTERNAL SHORT AXIS VIEW): 0.8 CM
LAAS-AP4: 16.2 CM2
LEFT INTERNAL DIMENSION IN SYSTOLE: 2.5 CM (ref 2.1–4)
LEFT VENTRICULAR INTERNAL DIMENSION IN DIASTOLE: 4.4 CM (ref 3.71–5.52)
LEFT VENTRICULAR POSTERIOR WALL IN END DIASTOLE: 1 CM
LEFT VENTRICULAR STROKE VOLUME: 66 ML
LV EF: 73 %
MV E'TISSUE VEL-SEP: 5 CM/S
MV PEAK A VEL: 0.94 M/S
MV PEAK E VEL: 50 CM/S
MV STENOSIS PRESSURE HALF TIME: 0 MS
PA SYSTOLIC PRESSURE: 32 MMHG
RIGHT VENTRICLE ID DIMENSION: 2.9 CM
SL CV LV EF: 60
SL CV PED ECHO LEFT VENTRICLE DIASTOLIC VOLUME (MOD BIPLANE) 2D: 88 ML
SL CV PED ECHO LEFT VENTRICLE SYSTOLIC VOLUME (MOD BIPLANE) 2D: 22 ML
TR PEAK VELOCITY: 2.8 M/S
TRICUSPID VALVE PEAK REGURGITATION VELOCITY: 2.81 M/S
TRICUSPID VALVE S': 48 CM/S
TV PEAK GRADIENT: 32 MMHG
Z-SCORE OF LEFT VENTRICULAR DIMENSION IN END SYSTOLE: -0.28

## 2021-10-14 PROCEDURE — 93306 TTE W/DOPPLER COMPLETE: CPT

## 2021-10-14 PROCEDURE — 93306 TTE W/DOPPLER COMPLETE: CPT | Performed by: INTERNAL MEDICINE

## 2021-10-14 PROCEDURE — 93880 EXTRACRANIAL BILAT STUDY: CPT | Performed by: SURGERY

## 2021-10-14 PROCEDURE — 93880 EXTRACRANIAL BILAT STUDY: CPT

## 2021-10-18 ENCOUNTER — OFFICE VISIT (OUTPATIENT)
Dept: FAMILY MEDICINE CLINIC | Facility: CLINIC | Age: 76
End: 2021-10-18
Payer: MEDICARE

## 2021-10-18 VITALS
TEMPERATURE: 97.8 F | BODY MASS INDEX: 20.69 KG/M2 | SYSTOLIC BLOOD PRESSURE: 144 MMHG | HEART RATE: 67 BPM | RESPIRATION RATE: 18 BRPM | WEIGHT: 121.2 LBS | OXYGEN SATURATION: 99 % | HEIGHT: 64 IN | DIASTOLIC BLOOD PRESSURE: 80 MMHG

## 2021-10-18 DIAGNOSIS — I63.9 CEREBROVASCULAR ACCIDENT (CVA), UNSPECIFIED MECHANISM (HCC): ICD-10-CM

## 2021-10-18 DIAGNOSIS — E55.9 VITAMIN D DEFICIENCY: ICD-10-CM

## 2021-10-18 DIAGNOSIS — Z12.31 ENCOUNTER FOR SCREENING MAMMOGRAM FOR BREAST CANCER: ICD-10-CM

## 2021-10-18 DIAGNOSIS — E03.8 SUBCLINICAL HYPOTHYROIDISM: ICD-10-CM

## 2021-10-18 DIAGNOSIS — I10 PRIMARY HYPERTENSION: Primary | ICD-10-CM

## 2021-10-18 DIAGNOSIS — Z12.2 ENCOUNTER FOR SCREENING FOR LUNG CANCER: ICD-10-CM

## 2021-10-18 DIAGNOSIS — Z87.891 PERSONAL HISTORY OF NICOTINE DEPENDENCE: ICD-10-CM

## 2021-10-18 DIAGNOSIS — Z00.00 ENCOUNTER FOR ANNUAL WELLNESS VISIT (AWV) IN MEDICARE PATIENT: ICD-10-CM

## 2021-10-18 DIAGNOSIS — Z23 ENCOUNTER FOR IMMUNIZATION: ICD-10-CM

## 2021-10-18 PROBLEM — H54.62 VISION LOSS OF LEFT EYE: Status: RESOLVED | Noted: 2021-08-03 | Resolved: 2021-10-18

## 2021-10-18 PROCEDURE — 90662 IIV NO PRSV INCREASED AG IM: CPT

## 2021-10-18 PROCEDURE — 99214 OFFICE O/P EST MOD 30 MIN: CPT | Performed by: INTERNAL MEDICINE

## 2021-10-18 PROCEDURE — 1123F ACP DISCUSS/DSCN MKR DOCD: CPT

## 2021-10-18 PROCEDURE — G0008 ADMIN INFLUENZA VIRUS VAC: HCPCS

## 2021-10-18 PROCEDURE — G0438 PPPS, INITIAL VISIT: HCPCS | Performed by: INTERNAL MEDICINE

## 2021-10-22 ENCOUNTER — TELEPHONE (OUTPATIENT)
Dept: NEUROLOGY | Facility: CLINIC | Age: 76
End: 2021-10-22

## 2021-11-01 ENCOUNTER — CLINICAL SUPPORT (OUTPATIENT)
Dept: FAMILY MEDICINE CLINIC | Facility: CLINIC | Age: 76
End: 2021-11-01
Payer: MEDICARE

## 2021-11-01 VITALS
OXYGEN SATURATION: 99 % | SYSTOLIC BLOOD PRESSURE: 170 MMHG | RESPIRATION RATE: 18 BRPM | DIASTOLIC BLOOD PRESSURE: 84 MMHG | HEART RATE: 84 BPM

## 2021-11-01 DIAGNOSIS — I10 HYPERTENSION, UNSPECIFIED TYPE: Primary | ICD-10-CM

## 2021-11-01 DIAGNOSIS — I10 PRIMARY HYPERTENSION: Primary | ICD-10-CM

## 2021-11-01 PROCEDURE — 99211 OFF/OP EST MAY X REQ PHY/QHP: CPT

## 2021-11-01 RX ORDER — METOPROLOL SUCCINATE 25 MG/1
25 TABLET, EXTENDED RELEASE ORAL DAILY
Qty: 30 TABLET | Refills: 5 | Status: SHIPPED | OUTPATIENT
Start: 2021-11-01 | End: 2022-04-22 | Stop reason: SDUPTHER

## 2021-11-05 ENCOUNTER — HOSPITAL ENCOUNTER (OUTPATIENT)
Dept: CT IMAGING | Facility: HOSPITAL | Age: 76
Discharge: HOME | End: 2021-11-05
Attending: INTERNAL MEDICINE
Payer: MEDICARE

## 2021-11-05 DIAGNOSIS — Z87.891 PERSONAL HISTORY OF NICOTINE DEPENDENCE: ICD-10-CM

## 2021-11-05 DIAGNOSIS — Z12.2 ENCOUNTER FOR SCREENING FOR LUNG CANCER: ICD-10-CM

## 2021-11-05 PROCEDURE — 71271 CT THORAX LUNG CANCER SCR C-: CPT

## 2021-11-08 ENCOUNTER — IMMUNIZATIONS (OUTPATIENT)
Dept: FAMILY MEDICINE CLINIC | Facility: HOSPITAL | Age: 76
End: 2021-11-08

## 2021-11-08 DIAGNOSIS — Z23 ENCOUNTER FOR IMMUNIZATION: Primary | ICD-10-CM

## 2021-11-08 PROCEDURE — 0013A COVID-19 MODERNA VACC 0.25 ML BOOSTER: CPT

## 2021-11-08 PROCEDURE — 91306 COVID-19 MODERNA VACC 0.25 ML BOOSTER: CPT

## 2021-11-11 DIAGNOSIS — E03.9 HYPOTHYROIDISM, UNSPECIFIED TYPE: ICD-10-CM

## 2021-11-11 RX ORDER — LEVOTHYROXINE SODIUM 0.05 MG/1
50 TABLET ORAL
Qty: 30 TABLET | Refills: 5 | Status: SHIPPED | OUTPATIENT
Start: 2021-11-11 | End: 2022-05-02 | Stop reason: SDUPTHER

## 2021-11-16 ENCOUNTER — OFFICE VISIT (OUTPATIENT)
Dept: FAMILY MEDICINE CLINIC | Facility: CLINIC | Age: 76
End: 2021-11-16
Payer: MEDICARE

## 2021-11-16 VITALS
OXYGEN SATURATION: 98 % | DIASTOLIC BLOOD PRESSURE: 82 MMHG | HEIGHT: 64 IN | SYSTOLIC BLOOD PRESSURE: 150 MMHG | HEART RATE: 73 BPM | TEMPERATURE: 98 F | BODY MASS INDEX: 20.8 KG/M2

## 2021-11-16 DIAGNOSIS — I10 PRIMARY HYPERTENSION: ICD-10-CM

## 2021-11-16 PROCEDURE — 99213 OFFICE O/P EST LOW 20 MIN: CPT | Performed by: NURSE PRACTITIONER

## 2021-11-16 RX ORDER — AMLODIPINE BESYLATE 10 MG/1
10 TABLET ORAL DAILY
Qty: 30 TABLET | Refills: 2
Start: 2021-11-16 | End: 2022-02-21 | Stop reason: SDUPTHER

## 2021-11-22 ENCOUNTER — TELEPHONE (OUTPATIENT)
Dept: FAMILY MEDICINE CLINIC | Facility: CLINIC | Age: 76
End: 2021-11-22

## 2021-11-30 ENCOUNTER — CLINICAL SUPPORT (OUTPATIENT)
Dept: FAMILY MEDICINE CLINIC | Facility: CLINIC | Age: 76
End: 2021-11-30
Payer: MEDICARE

## 2021-11-30 VITALS — DIASTOLIC BLOOD PRESSURE: 62 MMHG | HEART RATE: 68 BPM | SYSTOLIC BLOOD PRESSURE: 118 MMHG | OXYGEN SATURATION: 95 %

## 2021-11-30 DIAGNOSIS — Z01.30 BP CHECK: Primary | ICD-10-CM

## 2021-11-30 PROCEDURE — 99211 OFF/OP EST MAY X REQ PHY/QHP: CPT

## 2022-01-28 ENCOUNTER — OFFICE VISIT (OUTPATIENT)
Dept: NEUROLOGY | Facility: CLINIC | Age: 77
End: 2022-01-28
Payer: MEDICARE

## 2022-01-28 VITALS
BODY MASS INDEX: 22.02 KG/M2 | WEIGHT: 129 LBS | TEMPERATURE: 97.1 F | SYSTOLIC BLOOD PRESSURE: 157 MMHG | DIASTOLIC BLOOD PRESSURE: 70 MMHG | HEART RATE: 79 BPM | HEIGHT: 64 IN

## 2022-01-28 DIAGNOSIS — I10 PRIMARY HYPERTENSION: ICD-10-CM

## 2022-01-28 DIAGNOSIS — Z86.73 HISTORY OF STROKE: Primary | ICD-10-CM

## 2022-01-28 PROCEDURE — 99213 OFFICE O/P EST LOW 20 MIN: CPT | Performed by: PSYCHIATRY & NEUROLOGY

## 2022-01-28 NOTE — ASSESSMENT & PLAN NOTE
Assessment:    Elissa Martines is a 67 y o   female with a history of HLD, and HTN who is seen today in Neurology clinic as a follow up for prior left sided temporal and occipital infracts  Since her last visit, patient is doing well  She denies any new stroke like symptoms  She continues to remain compliant with her medications  She denies any recent hospitalizations or bleeding  Plan:   · Recommend continuing with Asa 81 mg daily    · Continue with statin 40 mg for secondary stroke prevention  · Continue regular follow ups with PCP regarding Blood Pressure management  · Continue to avoid using NSAIDs for headaches or other pain and if needed would recommend to use Tylenol   · Recommend mediterranean diet & regular exercise regimen atleast 4-5 times a week for 20-30 minutes     · Patient counseled on recognizing the signs/symptoms of CVA  · Patient counseled on importance of medication compliance  · Patient verbalized understanding and all questions were addressed   · Follow up on as needed basis

## 2022-01-28 NOTE — PROGRESS NOTES
F/U stroke  No complaints    Feel good    Tired -> fall asleep, early waking    Appetite - good normal, gained     Mood good    Activity -> falls, off blance none  Next PCP on Monday

## 2022-01-28 NOTE — PROGRESS NOTES
Patient ID: Nahid Oconnor is a 67 y o  female  Assessment/Plan:    Cerebrovascular accident (CVA) [facility])  Assessment:    Nahid Oconnor is a 67 y o   female with a history of HLD, and HTN who is seen today in Neurology clinic as a follow up for prior left sided temporal and occipital infracts  Since her last visit, patient is doing well  She denies any new stroke like symptoms  She continues to remain compliant with her medications  She denies any recent hospitalizations or bleeding  Plan:   · Recommend continuing with Asa 81 mg daily    · Continue with statin 40 mg for secondary stroke prevention  · Continue regular follow ups with PCP regarding Blood Pressure management  · Continue to avoid using NSAIDs for headaches or other pain and if needed would recommend to use Tylenol   · Recommend mediterranean diet & regular exercise regimen atleast 4-5 times a week for 20-30 minutes  · Patient counseled on recognizing the signs/symptoms of CVA  · Patient counseled on importance of medication compliance  · Patient verbalized understanding and all questions were addressed   · Follow up on as needed basis            Diagnoses and all orders for this visit:    Cerebrovascular accident (CVA), unspecified mechanism ([facility] [address] )           Subjective:  Nahid Oconnor is a 67 y o  female is seen today in the neurology clinic as a follow up for prior history of stroke in the left temporal and left occipital region  Since her last visit patient has been doing well  She denies any new stroke like symptoms  New Neurological deficits  She continues to remain compliant with her medications  She does monitor her blood pressure and follows with her PCP  No recent falls, hospitalizations or head trauma  She denies any concerns of bleeding or changes to her medical history  She denies any issues with her mood, appetite or sleep recently      Have you had any new stroke like symptoms that were not previously present (Sudden loss of vision, difficulty speaking or swallowing,  vertigo/room spinning that did not quickly resolve, weakness or numbness affecting one side of the body)? no    Are you taking all of your medications as prescribed? Yes    Any side effects including bleeding/bruising? no      History reviewed  No pertinent past medical history      Social History     Socioeconomic History    Marital status: /Civil Union     Spouse name: None    Number of children: None    Years of education: None    Highest education level: None   Occupational History    None   Tobacco Use    Smoking status: Former Smoker     Packs/day: 0 50     Years: 40 00     Pack years: 20 00     Quit date: 2019     Years since quitting: 3 0    Smokeless tobacco: Never Used    Tobacco comment: Social    Vaping Use    Vaping Use: Never used   Substance and Sexual Activity    Alcohol use: Never    Drug use: Never    Sexual activity: None   Other Topics Concern    None   Social History Narrative    None     Social Determinants of Health     Financial Resource Strain: Not on file   Food Insecurity: Not on file   Transportation Needs: Not on file   Physical Activity: Not on file   Stress: Not on file   Social Connections: Not on file   Intimate Partner Violence: Not on file   Housing Stability: Not on file         Current Outpatient Medications:     aspirin (ECOTRIN LOW STRENGTH) 81 mg EC tablet, Take 1 tablet (81 mg total) by mouth daily, Disp: 90 tablet, Rfl: 1    atorvastatin (LIPITOR) 40 mg tablet, Take 1 tablet (40 mg total) by mouth every evening, Disp: 90 tablet, Rfl: 1    ergocalciferol (VITAMIN D2) 50,000 units, Take 1 capsule (50,000 Units total) by mouth once a week, Disp: 12 capsule, Rfl: 1    levothyroxine 50 mcg tablet, Take 1 tablet (50 mcg total) by mouth daily in the early morning, Disp: 30 tablet, Rfl: 5    metoprolol succinate (Toprol XL) 25 mg 24 hr tablet, Take 1 tablet (25 mg total) by mouth daily, Disp: 30 tablet, Rfl: 5   amLODIPine (NORVASC) 10 mg tablet, Take 1 tablet (10 mg total) by mouth daily (Patient taking differently: Take 5 mg by mouth daily  ), Disp: 30 tablet, Rfl: 2    No Known Allergies       The following portions of the patient's history were reviewed and updated as appropriate:   She  has no past medical history on file  She   Patient Active Problem List    Diagnosis Date Noted    Vitamin D deficiency 10/18/2021    Subclinical hypothyroidism 10/18/2021    Cerebrovascular accident (CVA) ([address] ) 10/01/2021    Memory loss 10/01/2021    Ambulatory dysfunction 10/01/2021    Quadrant anopia, 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    Atypical nevus 08/03/2021    Primary hypertension 08/03/2021     She  has a past surgical history that includes Cholecystectomy and Appendectomy  Her family history includes Cancer in her sister; Diabetes in her father and sister; Stroke in her mother  She  reports that she quit smoking about 3 years ago  She has a 20 00 pack-year smoking history  She has never used smokeless tobacco  She reports that she does not drink alcohol and does not use drugs  Current Outpatient Medications   Medication Sig Dispense Refill    aspirin (ECOTRIN LOW STRENGTH) 81 mg EC tablet Take 1 tablet (81 mg total) by mouth daily 90 tablet 1    atorvastatin (LIPITOR) 40 mg tablet Take 1 tablet (40 mg total) by mouth every evening 90 tablet 1    ergocalciferol (VITAMIN D2) 50,000 units Take 1 capsule (50,000 Units total) by mouth once a week 12 capsule 1    levothyroxine 50 mcg tablet Take 1 tablet (50 mcg total) by mouth daily in the early morning 30 tablet 5    metoprolol succinate (Toprol XL) 25 mg 24 hr tablet Take 1 tablet (25 mg total) by mouth daily 30 tablet 5    amLODIPine (NORVASC) 10 mg tablet Take 1 tablet (10 mg total) by mouth daily (Patient taking differently: Take 5 mg by mouth daily  ) 30 tablet 2     No current facility-administered medications for this visit       Current Outpatient Medications on File Prior to Visit   Medication Sig    aspirin (ECOTRIN LOW STRENGTH) 81 mg EC tablet Take 1 tablet (81 mg total) by mouth daily    atorvastatin (LIPITOR) 40 mg tablet Take 1 tablet (40 mg total) by mouth every evening    ergocalciferol (VITAMIN D2) 50,000 units Take 1 capsule (50,000 Units total) by mouth once a week    levothyroxine 50 mcg tablet Take 1 tablet (50 mcg total) by mouth daily in the early morning    metoprolol succinate (Toprol XL) 25 mg 24 hr tablet Take 1 tablet (25 mg total) by mouth daily    amLODIPine (NORVASC) 10 mg tablet Take 1 tablet (10 mg total) by mouth daily (Patient taking differently: Take 5 mg by mouth daily  )     No current facility-administered medications on file prior to visit  She has No Known Allergies            Objective:    Blood pressure 157/70, pulse 79, temperature (!) 97 1 °F (36 2 °C), temperature source Tympanic, height 5' 4" (1 626 m), weight 58 5 kg (129 lb)  Physical Exam  Vitals reviewed  Constitutional:       Appearance: Normal appearance  HENT:      Head: Normocephalic and atraumatic  Mouth/Throat:      Mouth: Mucous membranes are moist    Eyes:      General: Lids are normal       Extraocular Movements: Extraocular movements intact  Pupils: Pupils are equal, round, and reactive to light  Neurological:      Mental Status: She is alert  Coordination: Romberg sign negative  Psychiatric:         Speech: Speech normal          Neurological Exam  Mental Status  Alert  Oriented to person, place and time  Speech is normal  Language is fluent with no aphasia  Cranial Nerves  CN II: Visual fields full to confrontation  Right funduscopic exam: not visualized  Left funduscopic exam: not visualized  CN III, IV, VI: Extraocular movements intact bilaterally  Normal lids and orbits bilaterally  Pupils equal round and reactive to light bilaterally  CN V: Facial sensation is normal   CN VII: Full and symmetric facial movement    CN VIII: Hearing is normal   CN XI: Shoulder shrug strength is normal   CN XII: Tongue midline without atrophy or fasciculations  Motor  Normal muscle bulk throughout  No fasciculations present  Normal muscle tone  Strength is 5/5 in all four extremities except as noted  Right Extremity   Deltoids: 5/5  Biceps :5/5  Triceps: 5/5  : 5/5  Hip flexors: 5/5  Knee Ext :5/5  Knee Flex: 5/5  Dorsiflexion 5/5  Plantarflexion: 5/5    Left Extremity   Deltoids: 5/5  Biceps :5/5  Triceps: 5/5  : 5/5  Hip flexors: 5/5  Knee Ext :5/5  Knee Flex: 5/5  Dorsiflexion 5/5  Plantarflexion: 5/5    Sensory  Light touch is normal in upper and lower extremities  Pinprick is normal in upper and lower extremities  Reflexes  Deep tendon reflexes are 2+ and symmetric except as noted  Coordination  Right: Finger-to-nose normal   Left: Finger-to-nose normal     Gait  Casual gait is normal including stance, stride, and arm swing  Normal tandem gait  Romberg is absent  ROS:  I reviewed the below ROS and what is mentioned in HPI, the remainder of ROS was negative  Review of Systems   Constitutional: Negative  Negative for appetite change and fever  HENT: Negative  Negative for hearing loss, tinnitus, trouble swallowing and voice change  Eyes: Negative  Negative for photophobia and pain  Respiratory: Negative  Negative for shortness of breath  Cardiovascular: Negative  Negative for palpitations  Gastrointestinal: Negative  Negative for nausea and vomiting  Endocrine: Negative  Negative for cold intolerance  Genitourinary: Negative  Negative for dysuria, frequency and urgency  Musculoskeletal: Negative  Negative for myalgias and neck pain  Skin: Negative  Negative for rash  Neurological: Negative  Negative for dizziness, tremors, seizures, syncope, facial asymmetry, speech difficulty, weakness, light-headedness, numbness and headaches  Hematological: Bruises/bleeds easily     Psychiatric/Behavioral: Negative for confusion, hallucinations and sleep disturbance

## 2022-01-31 ENCOUNTER — HOSPITAL ENCOUNTER (EMERGENCY)
Facility: HOSPITAL | Age: 77
Discharge: HOME | End: 2022-01-31
Attending: EMERGENCY MEDICINE
Payer: MEDICARE

## 2022-01-31 ENCOUNTER — OFFICE VISIT (OUTPATIENT)
Dept: FAMILY MEDICINE CLINIC | Facility: CLINIC | Age: 77
End: 2022-01-31
Payer: MEDICARE

## 2022-01-31 VITALS
OXYGEN SATURATION: 99 % | HEIGHT: 64 IN | BODY MASS INDEX: 22.33 KG/M2 | DIASTOLIC BLOOD PRESSURE: 66 MMHG | HEART RATE: 75 BPM | TEMPERATURE: 97.5 F | RESPIRATION RATE: 18 BRPM | SYSTOLIC BLOOD PRESSURE: 124 MMHG | WEIGHT: 130.8 LBS

## 2022-01-31 VITALS
WEIGHT: 128.75 LBS | DIASTOLIC BLOOD PRESSURE: 80 MMHG | HEART RATE: 81 BPM | SYSTOLIC BLOOD PRESSURE: 155 MMHG | OXYGEN SATURATION: 96 % | RESPIRATION RATE: 16 BRPM | BODY MASS INDEX: 21.98 KG/M2 | TEMPERATURE: 98 F | HEIGHT: 64 IN

## 2022-01-31 DIAGNOSIS — I10 PRIMARY HYPERTENSION: ICD-10-CM

## 2022-01-31 DIAGNOSIS — I63.9 CEREBROVASCULAR ACCIDENT (CVA), UNSPECIFIED MECHANISM (HCC): ICD-10-CM

## 2022-01-31 DIAGNOSIS — R32 URINARY INCONTINENCE, UNSPECIFIED TYPE: ICD-10-CM

## 2022-01-31 DIAGNOSIS — E55.9 VITAMIN D DEFICIENCY: ICD-10-CM

## 2022-01-31 DIAGNOSIS — N81.4 UTERINE PROLAPSE: Primary | ICD-10-CM

## 2022-01-31 DIAGNOSIS — E03.8 SUBCLINICAL HYPOTHYROIDISM: ICD-10-CM

## 2022-01-31 PROBLEM — N81.3 PROCIDENTIA OF UTERUS: Status: ACTIVE | Noted: 2022-01-31

## 2022-01-31 PROCEDURE — 99204 OFFICE O/P NEW MOD 45 MIN: CPT | Performed by: OBSTETRICS & GYNECOLOGY

## 2022-01-31 PROCEDURE — 99214 OFFICE O/P EST MOD 30 MIN: CPT | Performed by: INTERNAL MEDICINE

## 2022-01-31 PROCEDURE — 99284 EMERGENCY DEPT VISIT MOD MDM: CPT

## 2022-01-31 PROCEDURE — 99284 EMERGENCY DEPT VISIT MOD MDM: CPT | Performed by: EMERGENCY MEDICINE

## 2022-01-31 RX ADMIN — CONJUGATED ESTROGENS 0.5 G: 0.62 CREAM VAGINAL at 16:21

## 2022-01-31 NOTE — PROGRESS NOTES
[facility]        NAME: Jossie Valencia is a 67 y o  female  : 1945    MRN: [id number]  DATE: 2022  TIME: 12:11 PM    Assessment and Plan   1  Uterine prolapse  -Pt will need to be evaluated in ED, needs imaging and Gyn evaluation       -  Transfer to other facility    2  Primary hypertension  -Bp controlled today, continue amlodipine and toprol     -  CBC and differential; Future  -     Comprehensive metabolic panel; Future  -     Lipid Panel with Direct LDL reflex; Future    3  Cerebrovascular accident (CVA), unspecified mechanism ([address] )  -   Left temporal and occipital infarcts, she is taking ASA and lipitor, BP improved today  Follows with Neurology   -   CBC and differential; Future  -     Comprehensive metabolic panel; Future  -     Lipid Panel with Direct LDL reflex; Future    4  Subclinical hypothyroidism  -  Needs repeat TSH/FT4 at this time     - TSH, 3rd generation with Free T4 reflex; Future    5  Urinary incontinence, unspecified type  -likely due to uterine prolapse (see above)    -   POCT urine dip    6  Vitamin D deficiency  -25OH-D low at 11 5, she is taking high-dose repletion, needs repeat labs  -  Vitamin D 25 hydroxy; Future             Chief Complaint     Chief Complaint   Patient presents with    Follow-up    Urine Leakage     becoming more frequent  Pt states there is bleeding as well   Mass     in genital area, Pt states shes had it for a little while  No pain         History of Present Illness       Here for follow up HTN, CVA  HTN: taking amlodipine 10mg daily, one full tab, as well as toprol  Denies headaches, dizziness, chest pain  Hematuria: started a few weeks ago, increasing incontinence, needs to wear pad  Also reports mass in vaginal area, enlarging over past several months  Denies dysuria or painful urination         Review of Systems   Review of Systems   Constitutional: Negative for appetite change, chills, fatigue and fever    HENT: Positive for hearing loss  Respiratory: Negative for cough, chest tightness and shortness of breath  Cardiovascular: Negative for chest pain, palpitations and leg swelling  Gastrointestinal: Negative for abdominal pain, diarrhea, nausea and vomiting  Genitourinary: Positive for frequency, hematuria, urgency, vaginal bleeding, vaginal discharge and vaginal pain  Neurological: Negative for dizziness, weakness, light-headedness, numbness and headaches  Current Medications       Current Outpatient Medications:     amLODIPine (NORVASC) 10 mg tablet, Take 1 tablet (10 mg total) by mouth daily (Patient taking differently: Take 5 mg by mouth daily  ), Disp: 30 tablet, Rfl: 2    aspirin (ECOTRIN LOW STRENGTH) 81 mg EC tablet, Take 1 tablet (81 mg total) by mouth daily, Disp: 90 tablet, Rfl: 1    atorvastatin (LIPITOR) 40 mg tablet, Take 1 tablet (40 mg total) by mouth every evening, Disp: 90 tablet, Rfl: 1    ergocalciferol (VITAMIN D2) 50,000 units, Take 1 capsule (50,000 Units total) by mouth once a week, Disp: 12 capsule, Rfl: 1    levothyroxine 50 mcg tablet, Take 1 tablet (50 mcg total) by mouth daily in the early morning, Disp: 30 tablet, Rfl: 5    metoprolol succinate (Toprol XL) 25 mg 24 hr tablet, Take 1 tablet (25 mg total) by mouth daily, Disp: 30 tablet, Rfl: 5    Current Allergies     Allergies as of 01/31/2022    (No Known Allergies)            The following portions of the patient's history were reviewed and updated as appropriate: allergies, current medications, past family history, past medical history, past social history, past surgical history and problem list      No past medical history on file      Past Surgical History:   Procedure Laterality Date    APPENDECTOMY      CHOLECYSTECTOMY         Family History   Problem Relation Age of Onset    Stroke Mother     Diabetes Father     Diabetes Sister     Cancer Sister         brain cancer         Medications have been verified  Objective   /66   Pulse 75   Temp 97 5 °F (36 4 °C)   Resp 18   Ht 5' 4" (1 626 m)   Wt 59 3 kg (130 lb 12 8 oz)   SpO2 99%   BMI 22 45 kg/m²        Physical Exam     Physical Exam  Vitals reviewed  Exam conducted with a chaperone present  Constitutional:       General: She is not in acute distress  Appearance: She is normal weight  Cardiovascular:      Rate and Rhythm: Normal rate and regular rhythm  Heart sounds: Murmur heard  No friction rub  No gallop  Comments: Grade 2/6 holosystolic murmur at RUSB  Pulmonary:      Effort: Pulmonary effort is normal  No respiratory distress  Breath sounds: Normal breath sounds  No wheezing, rhonchi or rales  Abdominal:      General: Abdomen is flat  Bowel sounds are normal  There is no distension  Tenderness: There is no abdominal tenderness  There is no guarding or rebound  Genitourinary:     Exam position: Lithotomy position  Vagina: Prolapsed vaginal walls present  Cervix: Friability present  Uterus: With uterine prolapse  Neurological:      Mental Status: She is alert                   Results:  Lab Results   Component Value Date    SODIUM 137 08/04/2021    K 4 1 08/04/2021     08/04/2021    CO2 28 08/04/2021    BUN 14 10/01/2021    CREATININE 0 89 10/01/2021    CALCIUM 9 0 08/04/2021       No results found for: HGBA1C    Lab Results   Component Value Date    WBC 6 03 08/04/2021    HGB 12 5 08/04/2021    HCT 40 2 08/04/2021    MCV 91 08/04/2021     08/04/2021

## 2022-01-31 NOTE — PATIENT INSTRUCTIONS
[facility]  Please get blood work done to check kidneys, vitamin D, thyroid 
no

## 2022-01-31 NOTE — ED PROVIDER NOTES
Pt Name: Juan Gallegos  MRN: [id number]  [place] 1945  Age/Sex: 67 y o  female  Date of evaluation: 1/31/2022  PCP: Lorraine Lewis MD    CHIEF COMPLAINT    Chief Complaint   Patient presents with    Uterine Prolapse     Sent from PCP for uterine prolapse  Reports it has been going on for weeks but was just able to be seen at PCP today Denies any pain  Reports intermittent bleeding  VERN Daly presents to the Emergency Department complaining of uterine prolapse  She was sent from PCP for further evaluation  HPI      Past Medical and Surgical History    Past Medical History:   Diagnosis Date    Hypertension        Past Surgical History:   Procedure Laterality Date    APPENDECTOMY      CHOLECYSTECTOMY         Family History   Problem Relation Age of Onset   Julmartin Liming Stroke Mother     Diabetes Father     Diabetes Sister     Cancer Sister         brain cancer       Social History     Tobacco Use    Smoking status: Former Smoker     Packs/day: 0 50     Years: 40 00     Pack years: 20 00     Quit date: 2019     Years since quitting: 3 0    Smokeless tobacco: Never Used    Tobacco comment: Social    Vaping Use    Vaping Use: Never used   Substance Use Topics    Alcohol use: Never    Drug use: Never           Allergies    No Known Allergies    Home Medications    Prior to Admission medications    Medication Sig Start Date End Date Taking?  Authorizing Provider   amLODIPine (NORVASC) 10 mg tablet Take 1 tablet (10 mg total) by mouth daily  Patient taking differently: Take 5 mg by mouth daily   11/16/21   WHITNEY العراقي   aspirin (ECOTRIN LOW STRENGTH) 81 mg EC tablet Take 1 tablet (81 mg total) by mouth daily 9/30/21   Lorraine Lewis MD   atorvastatin (LIPITOR) 40 mg tablet Take 1 tablet (40 mg total) by mouth every evening 9/30/21   Lorraine Lewis MD   conjugated estrogens (PREMARIN) vaginal cream Insert 1 g into the vagina 3 (three) times a week 1/31/22   Mario Hernandez, DO ergocalciferol (VITAMIN D2) 50,000 units Take 1 capsule (50,000 Units total) by mouth once a week 9/17/21   Daryl Zhu MD   levothyroxine 50 mcg tablet Take 1 tablet (50 mcg total) by mouth daily in the early morning 11/11/21   WHITNEY Carvalho   metoprolol succinate (Toprol XL) 25 mg 24 hr tablet Take 1 tablet (25 mg total) by mouth daily 11/1/21   WHITNEY Cavralho           Review of Systems    Review of Systems   Constitutional: Negative for chills and fever  HENT: Negative for ear pain and sore throat  Eyes: Negative for pain and visual disturbance  Respiratory: Negative for cough and shortness of breath  Cardiovascular: Negative for chest pain and palpitations  Gastrointestinal: Negative for abdominal pain and vomiting  Genitourinary: Negative for dysuria and hematuria  Musculoskeletal: Negative for arthralgias and back pain  Skin: Negative for color change and rash  Neurological: Negative for seizures and syncope  All other systems reviewed and are negative  Physical Exam      ED Triage Vitals [01/31/22 1445]   Temperature Pulse Respirations Blood Pressure SpO2   98 °F (36 7 °C) 81 16 155/80 96 %      Temp Source Heart Rate Source Patient Position - Orthostatic VS BP Location FiO2 (%)   Oral Monitor Sitting Left arm --      Pain Score       No Pain               Physical Exam  Vitals and nursing note reviewed  Constitutional:       General: She is not in acute distress  Appearance: She is well-developed  HENT:      Head: Normocephalic and atraumatic  Eyes:      Conjunctiva/sclera: Conjunctivae normal    Cardiovascular:      Rate and Rhythm: Normal rate and regular rhythm  Heart sounds: No murmur heard  Pulmonary:      Effort: Pulmonary effort is normal  No respiratory distress  Breath sounds: Normal breath sounds  Abdominal:      Palpations: Abdomen is soft  Tenderness: There is no abdominal tenderness     Genitourinary:     Uterus: With uterine prolapse  Musculoskeletal:      Cervical back: Neck supple  Skin:     General: Skin is warm and dry  Neurological:      Mental Status: She is alert  Assessment and Plan    Harsh Overall is a 67 y o  female who presents with uterine prolapse  Physical examination remarkable for (see media tab)  MDM    Diagnostic Results  Labs:      Procedure    Procedures      ED Course of Care and Re-Assessments     There is no indication for imaging at this point  Patient was evaluated by GYN  Outpatient follow up is appropriate  She can ambulate without a problem  She is able to urinate and is not in pain  She will use premarin cream       Medications   conjugated estrogens (PREMARIN) vaginal cream 0 5 g (has no administration in time range)           FINAL IMPRESSION    Final diagnoses:   Uterine prolapse         DISPOSITION/PLAN    Time reflects when diagnosis was documented in both MDM as applicable and the Disposition within this note     Time User Action Codes Description Comment    1/31/2022  3:37 PM Virginia Pierre Add [N81 4] Uterine prolapse       ED Disposition     None      Follow-up Information     Follow up With Specialties Details Why Contact Info Additional [address] [facility] Schedule an appointment as soon as possible for a visit  ask for an appointment with Migs/urogynecology  [facility]  [address], [address] [place] [address], [address] [place] [address], [id number] [phone]            PATIENT REFERRED TO:    [facility]  [address]  [facility] [address] [facility] [id number] [phone]  Schedule an appointment as soon as possible for a visit   ask for an appointment with Migs/urogynecology        DISCHARGE MEDICATIONS:    Patient's Medications   Discharge Prescriptions    CONJUGATED ESTROGENS (PREMARIN) VAGINAL CREAM    Insert 1 g into the vagina 3 (three) times a week       Start Date: 1/31/2022 End Date: --       Order Dose: 1 g       Quantity: 30 g    Refills: 1       No discharge procedures on file           Jory Selby, [address] [place] [address],   01/31/22 1821

## 2022-01-31 NOTE — CONSULTS
Consult - OB/GYN   Harsh Overall 67 y o  female MRN: [id number]  Unit/Bed#: ED 25 Encounter: [id number]    4 y o  P3 post-menopausal woman    She is a patient of [facility]    Chief complaint: "something coming out of me"    HPI: Abdelrahman Schaefer is a 72y/o who presents for evaluation of uterine prolapse  Patient was seen in PCP office for this complaint and advised to come to the ED for evaluation  Patient reports that she has noticed the bulge for several months  She denies any dysuria but does report symptoms or urge incontinence  She reports that she is able to completely empty her bladder  She also notes some spotting since the bulge began as well  OBHx:  x3    Active Problems:  Patient Active Problem List   Diagnosis    Atypical nevus    Primary hypertension    Cerebrovascular accident (CVA) ([facility] [address] )    Memory loss    Ambulatory dysfunction    Quadrant anopia, left    Vitamin D deficiency    Subclinical hypothyroidism       PMH:  Past Medical History:   Diagnosis Date    Hypertension        PSH:  Past Surgical History:   Procedure Laterality Date    APPENDECTOMY      CHOLECYSTECTOMY         Meds:  No current facility-administered medications on file prior to encounter       Current Outpatient Medications on File Prior to Encounter   Medication Sig Dispense Refill    amLODIPine (NORVASC) 10 mg tablet Take 1 tablet (10 mg total) by mouth daily (Patient taking differently: Take 5 mg by mouth daily  ) 30 tablet 2    aspirin (ECOTRIN LOW STRENGTH) 81 mg EC tablet Take 1 tablet (81 mg total) by mouth daily 90 tablet 1    atorvastatin (LIPITOR) 40 mg tablet Take 1 tablet (40 mg total) by mouth every evening 90 tablet 1    ergocalciferol (VITAMIN D2) 50,000 units Take 1 capsule (50,000 Units total) by mouth once a week 12 capsule 1    levothyroxine 50 mcg tablet Take 1 tablet (50 mcg total) by mouth daily in the early morning 30 tablet 5    metoprolol succinate (Toprol XL) 25 mg 24 hr tablet Take 1 tablet (25 mg total) by mouth daily 30 tablet 5       Allergies:  No Known Allergies    Physical Exam:  /80 (BP Location: Left arm)   Pulse 81   Temp 98 °F (36 7 °C) (Oral)   Resp 16   Ht 5' 4" (1 626 m)   Wt 58 4 kg (128 lb 12 oz)   SpO2 96%   BMI 22 10 kg/m²     Physical Exam  Constitutional:       Appearance: Normal appearance  HENT:      Head: Normocephalic and atraumatic  Eyes:      Extraocular Movements: Extraocular movements intact  Pupils: Pupils are equal, round, and reactive to light  Cardiovascular:      Rate and Rhythm: Normal rate  Pulmonary:      Effort: Pulmonary effort is normal    Genitourinary:     Comments: Complete procidentia of uterus  Some ulceration of exposed portion of uterus  Tissue appears to be keratinized  Prolapse cleansed, premarin cream applied and prolapse reduced      Neurological:      Mental Status: She is alert  Assessment:   67 y o  P3 who presents for evaluation of complete procidentia  Prolapse reduced at bedside today  Discussed with patient that there is a possibility of prolapse coming out even after reduction today but that this is better managed in the office setting as there are no pessaries available in the ED  Plan:   1  Premarin cream ordered for home, to be applied 3x a week  2  Follow up outpatient in [address] [place] [address] for possible pessary vs surgical management    Patient seen and examined with Dr Raymundo Whitlock MD, PGY-4  1/31/2022  4:29 PM

## 2022-02-14 DIAGNOSIS — E55.9 VITAMIN D DEFICIENCY: ICD-10-CM

## 2022-02-14 RX ORDER — ERGOCALCIFEROL 1.25 MG/1
50000 CAPSULE ORAL WEEKLY
Qty: 12 CAPSULE | Refills: 1 | Status: SHIPPED | OUTPATIENT
Start: 2022-02-14 | End: 2022-05-02

## 2022-02-14 NOTE — TELEPHONE ENCOUNTER
Patient requesting refill(s) of:  Ergocalciferol 50,000 units    Last filled:  9/17/21  Last appt:  1/31/22  Next appt:  5 2 22  Ph[facility]:  [facility]

## 2022-02-21 DIAGNOSIS — I10 PRIMARY HYPERTENSION: ICD-10-CM

## 2022-02-21 RX ORDER — AMLODIPINE BESYLATE 10 MG/1
10 TABLET ORAL DAILY
Qty: 30 TABLET | Refills: 5 | Status: SHIPPED | OUTPATIENT
Start: 2022-02-21

## 2022-02-21 NOTE — TELEPHONE ENCOUNTER
Patient requesting refill(s) of: Norvasc 10mg    Last filled: 11/16/21 #30x2  Last appt: 1/31/22  Next appt: 5/2/22  Pharmacy: [facility]

## 2022-02-21 NOTE — PROGRESS NOTES
[facility] [address], [address] [place]    Assessment/Plan:  No problem-specific Assessment & Plan notes found for this encounter  Diagnoses and all orders for this visit:    Procidentia of uterus  -     Pessary    Reviewed all options to treat uterine prolapse:  Pessary, colpocleisis and no treatment  - Will fit for pessary today  Patient is aware that it may take additional fittings to find the right pessary    - Will call if Gellhorn falls out and schedule follow-up sooner than 2 weeks  - To call if unable to void or have a bowel movement  Subjective:   Danii Hansen is a 67 y o  No obstetric history on file  female  CC: prolapse    HPI: Jeni Musa presents with complete prolapse  States that she has leaking of urine daily  Notes some discharge with wiping  Does not have any pain at this time  Believes that the prolapse happened a few months ago  States that she has occasional constipation  States that she is using Premarin vaginal cream, but is using daily vs Monday Wednesday Friday  ROS: Review of Systems   Constitutional: Negative for activity change, appetite change, fatigue, fever and unexpected weight change  Respiratory: Negative for shortness of breath  Cardiovascular: Negative for chest pain, palpitations and leg swelling  Gastrointestinal: Negative for blood in stool and constipation  Genitourinary: Positive for frequency and urgency  Negative for pelvic pain, vaginal bleeding, vaginal discharge and vaginal pain  Urinary incontinence   Psychiatric/Behavioral: The patient is nervous/anxious          PFSH: The following portions of the patient's history were reviewed and updated as appropriate: allergies, current medications, past family history, past medical history, obstetric history, gynecologic history, past social history, past surgical history and problem list        Objective:  /74 (BP Location: Right arm, Patient Position: Sitting, Cuff Size: Standard)   Temp 97 6 °F (36 4 °C)   Ht 5' 4" (1 626 m)   Wt 59 4 kg (131 lb)   LMP  (LMP Unknown)   BMI 22 49 kg/m²    Physical Exam  Constitutional:       Appearance: Normal appearance  Cardiovascular:      Rate and Rhythm: Normal rate  Heart sounds: Normal heart sounds  Pulmonary:      Effort: Pulmonary effort is normal    Abdominal:      Tenderness: There is no abdominal tenderness  There is no guarding or rebound  Genitourinary:     General: Normal vulva  Exam position: Lithotomy position  Labia:         Right: No rash, tenderness or lesion  Left: No rash, tenderness or lesion  Vagina: Erythema and prolapsed vaginal walls present  No vaginal discharge or tenderness  Uterus: With uterine prolapse  Not tender  Comments: Complete prolapse uterus and cervix  No pain with manual pressure to reduce prolapse  Cervix slightly irritated from exposure to pad and friction  Neurological:      Mental Status: She is alert and oriented to person, place, and time  Psychiatric:         Mood and Affect: Mood normal          Behavior: Behavior normal        Pessary    Date/Time: 2/22/2022 11:15 AM  Performed by: Karthik Shah CNM  Authorized by: Karthik Shah CNM   Universal Protocol:  Consent: Verbal consent obtained  Risks and benefits: risks, benefits and alternatives were discussed  Consent given by: patient  Time out: Immediately prior to procedure a "time out" was called to verify the correct patient, procedure, equipment, support staff and site/side marked as required    Patient understanding: patient states understanding of the procedure being performed  Patient consent: the patient's understanding of the procedure matches consent given  Relevant documents: relevant documents present and verified  Required items: required blood products, implants, devices, and special equipment available  Patient identity confirmed: verbally with patient      Indication: Indication for pessary: uterine prolapse and incontinence    Pre-procedure:     Pessary procedure type:  Fitting  Procedure:     Pessary type:  Gellhorn flexible    Patient tolerance of procedure: Tolerated well, no immediate complications  Comments:     Procedure comments:  Reviewed options related to prolapse  At this time Ade Mak would like to try a pessary  She is aware that it may take more than 1 fitting to get proper size and improvement of symptoms  Failed use of 4,5 ring with support  A number 4 Gellhorn was in vagina  No prolapse noted with cough or valsalva  Had Charls Gauze walk in rivas and void  Rechecked placement and minimal movement noted  Reviewed post placement recommendations  Continue with the Premarin vaginal cream 3 times per week not everyday  To call if pessary falls out and we will try next size up  Asked that she bring pessary back in plastic bag  Verbalizes understanding  If pessary is comfortable and no problems with voiding or bowel movement to schedule follow-up in 2 weeks

## 2022-02-22 ENCOUNTER — CONSULT (OUTPATIENT)
Dept: OBGYN CLINIC | Facility: CLINIC | Age: 77
End: 2022-02-22
Payer: MEDICARE

## 2022-02-22 VITALS
TEMPERATURE: 97.6 F | WEIGHT: 131 LBS | HEIGHT: 64 IN | DIASTOLIC BLOOD PRESSURE: 74 MMHG | BODY MASS INDEX: 22.36 KG/M2 | SYSTOLIC BLOOD PRESSURE: 136 MMHG

## 2022-02-22 DIAGNOSIS — N81.3 PROCIDENTIA OF UTERUS: Primary | ICD-10-CM

## 2022-02-22 PROCEDURE — 99202 OFFICE O/P NEW SF 15 MIN: CPT | Performed by: ADVANCED PRACTICE MIDWIFE

## 2022-02-22 PROCEDURE — A4562 PESSARY, NON RUBBER,ANY TYPE: HCPCS | Performed by: ADVANCED PRACTICE MIDWIFE

## 2022-02-22 PROCEDURE — 57160 INSERT PESSARY/OTHER DEVICE: CPT | Performed by: ADVANCED PRACTICE MIDWIFE

## 2022-03-22 NOTE — PROGRESS NOTES
[facility] [address], [place]    Assessment/Plan:  No problem-specific Assessment & Plan notes found for this encounter  Diagnoses and all orders for this visit:    Procidentia of uterus    Mixed stress and urge urinary incontinence    - Change Pessary to a #5 Gellhorn  To call office if pain or pressure develops, bleeding or irritation  Reinforced importance of calling if she is unable to urinate    - Post insertion had Tomeka Amaya ambulate and void  Voided without difficulty  - return in 2 weeks for recheck and if no problems will then be every 3 months  Subjective:   Héctor Perry is a 67 y o   female  CC: follow-up complete prolapse - pessary check and urinary incontinence  HPI: Tomeka Amaya presents for pessary check  States that she does not feel pessary and prolapse is no longer felt  Still has incontinence of urine with activity  She wears a pad   No vaginal bleeding, discharge or pain  She has been using the estrogen vaginal cream every 3 days  Has chronic constipation, denies straining to have bowel movement  States that prolapse feels better  ROS: Review of Systems   Constitutional: Negative for appetite change, chills, fatigue and fever  Gastrointestinal: Positive for constipation  Negative for abdominal pain and diarrhea  Genitourinary: Positive for frequency and urgency  Negative for decreased urine volume, dysuria, vaginal bleeding, vaginal discharge and vaginal pain         PFSH: The following portions of the patient's history were reviewed and updated as appropriate: allergies, current medications, past family history, past medical history, obstetric history, gynecologic history, past social history, past surgical history and problem list        Objective:  /60   Ht 5' 4" (1 626 m)   Wt 59 9 kg (132 lb)   LMP  (LMP Unknown)   BMI 22 66 kg/m²    Physical Exam  Pulmonary:      Effort: Pulmonary effort is normal    Genitourinary: General: Normal vulva  Exam position: Lithotomy position  Labia:         Right: No rash, tenderness or lesion  Left: No rash, tenderness or lesion  Vagina: No vaginal discharge, erythema, tenderness or bleeding  Cervix: No discharge, lesion or erythema  Comments: #4 Gellhorn Pessary grasped with ring forceps  Removed without difficulty  Speculum exam: no redness or discharge, negative lesions or excoriation  Will try number 5 Gellhorn to see if improvement with incontinence  Gellhorn inserted without difficulty  [facility] was able to void and denies discomfort post placement of pessary  Neurological:      Mental Status: She is alert and oriented to person, place, and time     Psychiatric:         Mood and Affect: Mood normal          Behavior: Behavior normal

## 2022-03-23 ENCOUNTER — OFFICE VISIT (OUTPATIENT)
Dept: OBGYN CLINIC | Facility: CLINIC | Age: 77
End: 2022-03-23
Payer: MEDICARE

## 2022-03-23 VITALS
HEIGHT: 64 IN | WEIGHT: 132 LBS | SYSTOLIC BLOOD PRESSURE: 140 MMHG | DIASTOLIC BLOOD PRESSURE: 60 MMHG | BODY MASS INDEX: 22.53 KG/M2

## 2022-03-23 DIAGNOSIS — N81.3 PROCIDENTIA OF UTERUS: Primary | ICD-10-CM

## 2022-03-23 DIAGNOSIS — N39.46 MIXED STRESS AND URGE URINARY INCONTINENCE: ICD-10-CM

## 2022-03-23 DIAGNOSIS — Z46.89 ENCOUNTER FOR FITTING AND ADJUSTMENT OF PESSARY: ICD-10-CM

## 2022-03-23 PROCEDURE — A4562 PESSARY, NON RUBBER,ANY TYPE: HCPCS | Performed by: ADVANCED PRACTICE MIDWIFE

## 2022-03-23 PROCEDURE — 99212 OFFICE O/P EST SF 10 MIN: CPT | Performed by: ADVANCED PRACTICE MIDWIFE

## 2022-03-25 ENCOUNTER — TELEPHONE (OUTPATIENT)
Dept: OBGYN CLINIC | Facility: CLINIC | Age: 77
End: 2022-03-25

## 2022-04-04 DIAGNOSIS — I63.9 CEREBROVASCULAR ACCIDENT (CVA), UNSPECIFIED MECHANISM (HCC): ICD-10-CM

## 2022-04-04 DIAGNOSIS — E78.00 ELEVATED CHOLESTEROL: ICD-10-CM

## 2022-04-04 RX ORDER — ATORVASTATIN CALCIUM 40 MG/1
40 TABLET, FILM COATED ORAL EVERY EVENING
Qty: 90 TABLET | Refills: 3 | Status: SHIPPED | OUTPATIENT
Start: 2022-04-04

## 2022-04-04 NOTE — TELEPHONE ENCOUNTER
Patient requesting refill(s) of:  Atorvastatin (Lipitor) 40 mg  Last filled:9/30/21  Last appt:1/31/22  Next appt:5/2/22  Pharmacy:[facility], [place]

## 2022-04-22 DIAGNOSIS — I10 PRIMARY HYPERTENSION: ICD-10-CM

## 2022-04-22 RX ORDER — METOPROLOL SUCCINATE 25 MG/1
25 TABLET, EXTENDED RELEASE ORAL DAILY
Qty: 90 TABLET | Refills: 3 | Status: SHIPPED | OUTPATIENT
Start: 2022-04-22

## 2022-04-22 NOTE — TELEPHONE ENCOUNTER
Patient requesting refill(s) of:  Metoprolol 25mg    Last filled:11/1/21  Last appt:1/31/22  Next appt:5/2/22  Pharmacy:[facility], [address],

## 2022-04-30 ENCOUNTER — APPOINTMENT (OUTPATIENT)
Dept: LAB | Facility: CLINIC | Age: 77
End: 2022-04-30
Payer: MEDICARE

## 2022-04-30 DIAGNOSIS — I63.9 CEREBROVASCULAR ACCIDENT (CVA), UNSPECIFIED MECHANISM (HCC): ICD-10-CM

## 2022-04-30 DIAGNOSIS — I10 PRIMARY HYPERTENSION: ICD-10-CM

## 2022-04-30 DIAGNOSIS — E03.8 SUBCLINICAL HYPOTHYROIDISM: ICD-10-CM

## 2022-04-30 DIAGNOSIS — E55.9 VITAMIN D DEFICIENCY: ICD-10-CM

## 2022-04-30 LAB
25(OH)D3 SERPL-MCNC: >135 NG/ML (ref 30–100)
ALBUMIN SERPL BCP-MCNC: 3.8 G/DL (ref 3.5–5)
ALP SERPL-CCNC: 115 U/L (ref 46–116)
ALT SERPL W P-5'-P-CCNC: 23 U/L (ref 12–78)
ANION GAP SERPL CALCULATED.3IONS-SCNC: 6 MMOL/L (ref 4–13)
AST SERPL W P-5'-P-CCNC: 20 U/L (ref 5–45)
BASOPHILS # BLD AUTO: 0.03 THOUSANDS/ΜL (ref 0–0.1)
BASOPHILS NFR BLD AUTO: 1 % (ref 0–1)
BILIRUB SERPL-MCNC: 0.52 MG/DL (ref 0.2–1)
BUN SERPL-MCNC: 25 MG/DL (ref 5–25)
CALCIUM SERPL-MCNC: 9.7 MG/DL (ref 8.3–10.1)
CHLORIDE SERPL-SCNC: 106 MMOL/L (ref 100–108)
CHOLEST SERPL-MCNC: 150 MG/DL
CO2 SERPL-SCNC: 26 MMOL/L (ref 21–32)
CREAT SERPL-MCNC: 1.25 MG/DL (ref 0.6–1.3)
EOSINOPHIL # BLD AUTO: 0.21 THOUSAND/ΜL (ref 0–0.61)
EOSINOPHIL NFR BLD AUTO: 3 % (ref 0–6)
ERYTHROCYTE [DISTWIDTH] IN BLOOD BY AUTOMATED COUNT: 14.8 % (ref 11.6–15.1)
GFR SERPL CREATININE-BSD FRML MDRD: 41 ML/MIN/1.73SQ M
GLUCOSE P FAST SERPL-MCNC: 111 MG/DL (ref 65–99)
HCT VFR BLD AUTO: 38.2 % (ref 34.8–46.1)
HDLC SERPL-MCNC: 54 MG/DL
HGB BLD-MCNC: 12 G/DL (ref 11.5–15.4)
IMM GRANULOCYTES # BLD AUTO: 0.04 THOUSAND/UL (ref 0–0.2)
IMM GRANULOCYTES NFR BLD AUTO: 1 % (ref 0–2)
LDLC SERPL CALC-MCNC: 74 MG/DL (ref 0–100)
LYMPHOCYTES # BLD AUTO: 1.24 THOUSANDS/ΜL (ref 0.6–4.47)
LYMPHOCYTES NFR BLD AUTO: 20 % (ref 14–44)
MCH RBC QN AUTO: 28.1 PG (ref 26.8–34.3)
MCHC RBC AUTO-ENTMCNC: 31.4 G/DL (ref 31.4–37.4)
MCV RBC AUTO: 90 FL (ref 82–98)
MONOCYTES # BLD AUTO: 0.42 THOUSAND/ΜL (ref 0.17–1.22)
MONOCYTES NFR BLD AUTO: 7 % (ref 4–12)
NEUTROPHILS # BLD AUTO: 4.33 THOUSANDS/ΜL (ref 1.85–7.62)
NEUTS SEG NFR BLD AUTO: 68 % (ref 43–75)
NRBC BLD AUTO-RTO: 0 /100 WBCS
PLATELET # BLD AUTO: 213 THOUSANDS/UL (ref 149–390)
PMV BLD AUTO: 12.1 FL (ref 8.9–12.7)
POTASSIUM SERPL-SCNC: 4.5 MMOL/L (ref 3.5–5.3)
PROT SERPL-MCNC: 8.2 G/DL (ref 6.4–8.2)
RBC # BLD AUTO: 4.27 MILLION/UL (ref 3.81–5.12)
SODIUM SERPL-SCNC: 138 MMOL/L (ref 136–145)
T4 FREE SERPL-MCNC: 0.93 NG/DL (ref 0.76–1.46)
TRIGL SERPL-MCNC: 109 MG/DL
TSH SERPL DL<=0.05 MIU/L-ACNC: 16.1 UIU/ML (ref 0.45–4.5)
WBC # BLD AUTO: 6.27 THOUSAND/UL (ref 4.31–10.16)

## 2022-04-30 PROCEDURE — 80053 COMPREHEN METABOLIC PANEL: CPT

## 2022-04-30 PROCEDURE — 84439 ASSAY OF FREE THYROXINE: CPT

## 2022-04-30 PROCEDURE — 36415 COLL VENOUS BLD VENIPUNCTURE: CPT

## 2022-04-30 PROCEDURE — 82306 VITAMIN D 25 HYDROXY: CPT

## 2022-04-30 PROCEDURE — 85025 COMPLETE CBC W/AUTO DIFF WBC: CPT

## 2022-04-30 PROCEDURE — 80061 LIPID PANEL: CPT

## 2022-04-30 PROCEDURE — 84443 ASSAY THYROID STIM HORMONE: CPT

## 2022-05-02 ENCOUNTER — OFFICE VISIT (OUTPATIENT)
Dept: FAMILY MEDICINE CLINIC | Facility: CLINIC | Age: 77
End: 2022-05-02
Payer: MEDICARE

## 2022-05-02 VITALS
HEART RATE: 72 BPM | TEMPERATURE: 97.6 F | HEIGHT: 64 IN | BODY MASS INDEX: 23.05 KG/M2 | SYSTOLIC BLOOD PRESSURE: 120 MMHG | RESPIRATION RATE: 18 BRPM | DIASTOLIC BLOOD PRESSURE: 68 MMHG | WEIGHT: 135 LBS | OXYGEN SATURATION: 98 %

## 2022-05-02 DIAGNOSIS — I63.9 CEREBROVASCULAR ACCIDENT (CVA), UNSPECIFIED MECHANISM (HCC): ICD-10-CM

## 2022-05-02 DIAGNOSIS — I10 PRIMARY HYPERTENSION: Primary | ICD-10-CM

## 2022-05-02 DIAGNOSIS — E55.9 VITAMIN D DEFICIENCY: ICD-10-CM

## 2022-05-02 DIAGNOSIS — E03.9 HYPOTHYROIDISM, UNSPECIFIED TYPE: ICD-10-CM

## 2022-05-02 DIAGNOSIS — N39.46 MIXED STRESS AND URGE URINARY INCONTINENCE: ICD-10-CM

## 2022-05-02 DIAGNOSIS — L82.1 SEBORRHEIC KERATOSES: ICD-10-CM

## 2022-05-02 DIAGNOSIS — N17.9 AKI (ACUTE KIDNEY INJURY) (HCC): ICD-10-CM

## 2022-05-02 PROBLEM — N18.31 STAGE 3A CHRONIC KIDNEY DISEASE (HCC): Status: ACTIVE | Noted: 2022-05-02

## 2022-05-02 LAB
BACTERIA UR QL AUTO: ABNORMAL /HPF
BILIRUB UR QL STRIP: NEGATIVE
CAOX CRY URNS QL MICRO: ABNORMAL /HPF
CLARITY UR: CLEAR
COLOR UR: YELLOW
GLUCOSE UR STRIP-MCNC: NEGATIVE MG/DL
HGB UR QL STRIP.AUTO: NEGATIVE
KETONES UR STRIP-MCNC: NEGATIVE MG/DL
LEUKOCYTE ESTERASE UR QL STRIP: NEGATIVE
NITRITE UR QL STRIP: NEGATIVE
NON-SQ EPI CELLS URNS QL MICRO: ABNORMAL /HPF
PH UR STRIP.AUTO: 7 [PH]
PROT UR STRIP-MCNC: ABNORMAL MG/DL
RBC #/AREA URNS AUTO: ABNORMAL /HPF
SP GR UR STRIP.AUTO: 1.02 (ref 1–1.03)
UROBILINOGEN UR STRIP-ACNC: 2 MG/DL
WBC #/AREA URNS AUTO: ABNORMAL /HPF

## 2022-05-02 PROCEDURE — 99214 OFFICE O/P EST MOD 30 MIN: CPT | Performed by: INTERNAL MEDICINE

## 2022-05-02 PROCEDURE — 81001 URINALYSIS AUTO W/SCOPE: CPT | Performed by: INTERNAL MEDICINE

## 2022-05-02 PROCEDURE — 87086 URINE CULTURE/COLONY COUNT: CPT | Performed by: INTERNAL MEDICINE

## 2022-05-02 RX ORDER — LEVOTHYROXINE SODIUM 0.07 MG/1
75 TABLET ORAL
Qty: 90 TABLET | Refills: 1 | Status: SHIPPED | OUTPATIENT
Start: 2022-05-02

## 2022-05-02 NOTE — PATIENT INSTRUCTIONS
Please stop D2 supplement, vitamin D levels have improved and are actually too high     Increase thyroid levothyroxine dose to 75mcg daily, new prescription sent to your pharmacy  Please get ultrasound of kidneys and blood work again in 1 month  Please see Dr Jesus العلي regarding mole on abdomen

## 2022-05-03 LAB — BACTERIA UR CULT: NORMAL

## 2022-05-08 NOTE — PROGRESS NOTES
[facility] [address] [place] [address]    ASSESSMENT/PLAN: Dez Best is a 67 y o   who presents for annual gynecologic exam     Encounter for routine gynecologic examination  - Routine well woman exam completed today  - Cervical Cancer Screening complete    - STI screening offered including HIV: not indicated based on hx or requested at time of visit  - Breast Cancer Screening: Last Mammogram Not on file, script given and number to schedule highlighted  - Colorectal cancer screening was not ordered  - The following were reviewed in today's visit: breast self exam, mammography screening ordered, adequate intake of calcium and vitamin D, exercise and age related changes  - RTO 1 yr    Additional problems addressed at this visit:  1  Routine gynecological examination    2  Postmenopausal  -     DXA bone density spine hip and pelvis; Future; Expected date: 2022    3  Procidentia of uterus    4  Pessary maintenance     - 3 month pessary check    5  Encounter for screening mammogram for breast cancer  -     Mammo screening bilateral w 3d & cad; Future; Expected date: 2022          CC:  Annual Gynecologic Examination    HPI: Dez Best is a 67 y o   who presents for annual gynecologic examination  Dangmartin Dawn presents today for gyn exam  No vaginal bleeding since onset of menopause  unknown last pap smear- normal, Hx of abnormal pap smear - no hx of abnormal pap smear  Sexually active- no- with same partner for 59 yrs  Unknown- mammogram , and no- colonoscopy  Reports 8 hrs of interrupted sleep daily, minimal servings of calcium rich foods daily  Exercises - no routineexercise per week  5 servings of caffeine daily- states that she is changing to decaf due to the leaking  Does not perform SBE  Concerns: states that she is being checked for overactive bladder by PCP  Gets up every 2 hours to empty bladder  Drinks a lot of coffee and was instructed to change to decaf  States that she has no discomfort from Pessary and no longer has pressure or pain  She feels that there was increase in the urinary incontinence after her stroke  Pessary has improved pain and pressure, but no effect on urinary incont  The following portions of the patient's history were reviewed and updated as appropriate: allergies, current medications, past family history, past medical history, obstetric history, gynecologic history, past social history, past surgical history and problem list     Review of Systems   Constitutional: Negative for activity change, appetite change, fatigue and fever  Respiratory: Negative for cough and shortness of breath  Cardiovascular: Negative for chest pain, palpitations and leg swelling  Gastrointestinal: Negative for constipation and diarrhea  Genitourinary: Positive for frequency and urgency  Negative for difficulty urinating, pelvic pain, vaginal bleeding, vaginal discharge and vaginal pain  Neurological: Negative for light-headedness and headaches  Psychiatric/Behavioral: The patient is not nervous/anxious  Objective:  /72 (BP Location: Right arm, Patient Position: Sitting, Cuff Size: Standard)   Ht 5' 4" (1 626 m)   Wt 61 7 kg (136 lb)   LMP  (LMP Unknown)   BMI 23 34 kg/m²    Physical Exam  Vitals reviewed  Constitutional:       Appearance: Normal appearance  HENT:      Head: Normocephalic  Neck:      Thyroid: No thyroid mass or thyroid tenderness  Cardiovascular:      Rate and Rhythm: Normal rate and regular rhythm  Heart sounds: Normal heart sounds  Pulmonary:      Effort: Pulmonary effort is normal       Breath sounds: Normal breath sounds  Chest:   Breasts:      Right: No mass, nipple discharge, skin change, tenderness or axillary adenopathy  Left: No mass, nipple discharge, skin change, tenderness or axillary adenopathy  Abdominal:      General: There is no distension  Palpations:  There is no mass       Tenderness: There is no abdominal tenderness  There is no guarding  Genitourinary:     General: Normal vulva  Exam position: Lithotomy position  Labia:         Right: No tenderness or lesion  Left: No tenderness or lesion  Vagina: No vaginal discharge, tenderness, bleeding or lesions  Cervix: No discharge, lesion, erythema or cervical bleeding  Uterus: Normal  Not enlarged and not tender  Adnexa:         Right: No mass, tenderness or fullness  Left: No mass, tenderness or fullness  Comments: Difficult removal of #5 Gelhorn  Patient tolerated well  Visual inspection- no bleeding, discharge or inflammation from pessary  Cleaned and reinserted  Musculoskeletal:      Cervical back: Normal range of motion  Lymphadenopathy:      Upper Body:      Right upper body: No axillary adenopathy  Left upper body: No axillary adenopathy  Skin:     General: Skin is warm and dry  Neurological:      Mental Status: She is alert     Psychiatric:         Mood and Affect: Mood normal          Behavior: Behavior normal          Judgment: Judgment normal              Lukasz Dickson CNM  [facility]  05/09/22 12:35 PM

## 2022-05-09 ENCOUNTER — ANNUAL EXAM (OUTPATIENT)
Dept: OBGYN CLINIC | Facility: CLINIC | Age: 77
End: 2022-05-09
Payer: MEDICARE

## 2022-05-09 VITALS
DIASTOLIC BLOOD PRESSURE: 72 MMHG | BODY MASS INDEX: 23.22 KG/M2 | WEIGHT: 136 LBS | SYSTOLIC BLOOD PRESSURE: 122 MMHG | HEIGHT: 64 IN

## 2022-05-09 DIAGNOSIS — Z46.89 PESSARY MAINTENANCE: ICD-10-CM

## 2022-05-09 DIAGNOSIS — Z01.419 ROUTINE GYNECOLOGICAL EXAMINATION: Primary | ICD-10-CM

## 2022-05-09 DIAGNOSIS — Z12.31 ENCOUNTER FOR SCREENING MAMMOGRAM FOR BREAST CANCER: ICD-10-CM

## 2022-05-09 DIAGNOSIS — Z78.0 POSTMENOPAUSAL: ICD-10-CM

## 2022-05-09 DIAGNOSIS — N81.3 PROCIDENTIA OF UTERUS: ICD-10-CM

## 2022-05-09 PROCEDURE — G0101 CA SCREEN;PELVIC/BREAST EXAM: HCPCS | Performed by: ADVANCED PRACTICE MIDWIFE

## 2022-05-11 ENCOUNTER — HOSPITAL ENCOUNTER (OUTPATIENT)
Dept: ULTRASOUND IMAGING | Facility: HOSPITAL | Age: 77
Discharge: HOME | End: 2022-05-11
Attending: INTERNAL MEDICINE
Payer: MEDICARE

## 2022-05-11 DIAGNOSIS — N17.9 AKI (ACUTE KIDNEY INJURY) (HCC): ICD-10-CM

## 2022-05-11 PROCEDURE — 76770 US EXAM ABDO BACK WALL COMP: CPT

## 2022-05-12 ENCOUNTER — TELEPHONE (OUTPATIENT)
Dept: FAMILY MEDICINE CLINIC | Facility: CLINIC | Age: 77
End: 2022-05-12

## 2022-05-12 NOTE — TELEPHONE ENCOUNTER
Thanks  Please advise pt that her US showed normal kidneys and bladder  There was incidentally noted lesions in spleen, likely hemangiomas  We can discuss getting an MRI to further evaluate at her follow up with me

## 2022-05-18 ENCOUNTER — CONSULT (OUTPATIENT)
Dept: SURGERY | Facility: CLINIC | Age: 77
End: 2022-05-18
Payer: MEDICARE

## 2022-05-18 VITALS
RESPIRATION RATE: 16 BRPM | TEMPERATURE: 97.8 F | OXYGEN SATURATION: 98 % | HEART RATE: 89 BPM | HEIGHT: 64 IN | DIASTOLIC BLOOD PRESSURE: 80 MMHG | BODY MASS INDEX: 23.05 KG/M2 | SYSTOLIC BLOOD PRESSURE: 148 MMHG | WEIGHT: 135 LBS

## 2022-05-18 DIAGNOSIS — L82.1 SEBORRHEIC KERATOSES: ICD-10-CM

## 2022-05-18 PROCEDURE — 99203 OFFICE O/P NEW LOW 30 MIN: CPT | Performed by: SURGERY

## 2022-05-19 PROBLEM — D22.9 ATYPICAL NEVUS: Status: RESOLVED | Noted: 2021-08-03 | Resolved: 2022-05-19

## 2022-05-19 PROBLEM — L82.1 SEBORRHEIC KERATOSES: Status: ACTIVE | Noted: 2022-05-19

## 2022-05-19 NOTE — PROGRESS NOTES
Consult - General Surgery   Grace Forward 67 y o  female MRN: [id number]  Encounter: [id number]    Assessment/Plan     76F with longstanding history of pigmented skin lesions that on my exam today appear consistent with variable types of seborrheic keratoses  I have entered images into Epic media and my note  I do not feel strongly about any biopsies or excisions today  I explained to the patient that if any sites were to become symptomatic for her, they could be excised with an in office procedure under local anesthesia but at this point I would just recommend clinical observation  She will return on an as needed basis  Can also consider formal dermatologic evaluation  History of Present Illness   Chief Complaint   Patient presents with    Nevus     Patient with longstanding skin lesions of various levels of pigmentation, raised, and appearing consistent with seborrheic keratoses  No bleeding or ulceration, minimal itchiness, was not aware of all of her sites but they have been documented today  No family or personal history of skin cancer, melanoma  Review of Systems   Constitutional: Negative for appetite change, fatigue and unexpected weight change  Skin:        Various pigmented skin lesions consistent with seborrheic keratoses   Hematological: Negative for adenopathy  Does not bruise/bleed easily  All other systems reviewed and are negative        Historical Information   Past Medical History:   Diagnosis Date    Hypertension      Past Surgical History:   Procedure Laterality Date    APPENDECTOMY      CHOLECYSTECTOMY       Social History   Social History     Substance and Sexual Activity   Alcohol Use Never     Social History     Substance and Sexual Activity   Drug Use Never     Social History     Tobacco Use   Smoking Status Former Smoker    Packs/day: 0 50    Years: 40 00    Pack years: 20 00    Quit date: 2019    Years since quitting: 3 3   Smokeless Tobacco Never Used   Tobacco Comment    Social      Family History   Problem Relation Age of Onset   Matt Nevarez Stroke Mother     Diabetes Father     Diabetes Sister     Cancer Sister         brain cancer       Meds/Allergies     Current Outpatient Medications:     amLODIPine (NORVASC) 10 mg tablet, Take 1 tablet (10 mg total) by mouth daily, Disp: 30 tablet, Rfl: 5    aspirin (ECOTRIN LOW STRENGTH) 81 mg EC tablet, Take 1 tablet (81 mg total) by mouth daily, Disp: 90 tablet, Rfl: 1    atorvastatin (LIPITOR) 40 mg tablet, Take 1 tablet (40 mg total) by mouth every evening, Disp: 90 tablet, Rfl: 3    conjugated estrogens (PREMARIN) vaginal cream, Insert 1 g into the vagina 3 (three) times a week, Disp: 30 g, Rfl: 1    levothyroxine 75 mcg tablet, Take 1 tablet (75 mcg total) by mouth daily in the early morning, Disp: 90 tablet, Rfl: 1    metoprolol succinate (Toprol XL) 25 mg 24 hr tablet, Take 1 tablet (25 mg total) by mouth daily, Disp: 90 tablet, Rfl: 3  No Known Allergies    The following portions of the patient's history were reviewed and updated as appropriate: allergies, current medications, past family history, past medical history, past social history, past surgical history and problem list     Objective   Current Vitals:   Blood pressure 148/80, pulse 89, temperature 97 8 °F (36 6 °C), temperature source Tympanic, resp  rate 16, height 5' 4" (1 626 m), weight 61 2 kg (135 lb), SpO2 98 %  Physical Exam  Vitals reviewed  Constitutional:       General: She is not in acute distress  Appearance: Normal appearance  HENT:      Head: Normocephalic and atraumatic  Mouth/Throat:      Mouth: Mucous membranes are moist    Eyes:      Extraocular Movements: Extraocular movements intact  Conjunctiva/sclera: Conjunctivae normal       Pupils: Pupils are equal, round, and reactive to light  Cardiovascular:      Rate and Rhythm: Normal rate  Pulmonary:      Effort: Pulmonary effort is normal  No respiratory distress  Musculoskeletal:      Cervical back: Neck supple  Lymphadenopathy:      Cervical: No cervical adenopathy  Skin:     General: Skin is warm and dry  Comments: See attached photos for details    Multiple pigmented lesions along the upper abdomen consistent with seborrheic keratoses, similar smaller lesion on right lateral breast, additional similar lesion on the upper back and right scalp just above the ear   Neurological:      General: No focal deficit present  Mental Status: She is alert and oriented to person, place, and time  Psychiatric:         Mood and Affect: Mood normal          Behavior: Behavior normal          Signature:   Karyn Juan MD  Date: 5/19/2022 Time: 12:04 PM

## 2022-05-24 ENCOUNTER — TELEPHONE (OUTPATIENT)
Dept: FAMILY MEDICINE CLINIC | Facility: CLINIC | Age: 77
End: 2022-05-24

## 2022-05-24 NOTE — TELEPHONE ENCOUNTER
Please advise pt that it looks like we spoke with her on 05/12  Showed normal kidneys and bladder, small lesions in spleen likely hemangiomas and nothing to worry about right now  Does she remember speaking to someone about this?

## 2022-05-24 NOTE — TELEPHONE ENCOUNTER
Spoke with patient  States she does not remember anyone talking to her about results  Reviewed again with patient  She verbalized understanding and appreciation

## 2022-05-25 ENCOUNTER — HOSPITAL ENCOUNTER (OUTPATIENT)
Dept: BONE DENSITY | Facility: HOSPITAL | Age: 77
Discharge: HOME | End: 2022-05-25
Payer: MEDICARE

## 2022-05-25 ENCOUNTER — HOSPITAL ENCOUNTER (OUTPATIENT)
Dept: MAMMOGRAPHY | Facility: HOSPITAL | Age: 77
Discharge: HOME | End: 2022-05-25
Payer: MEDICARE

## 2022-05-25 VITALS — BODY MASS INDEX: 23.05 KG/M2 | HEIGHT: 64 IN | WEIGHT: 135 LBS

## 2022-05-25 DIAGNOSIS — Z78.0 POSTMENOPAUSAL: ICD-10-CM

## 2022-05-25 DIAGNOSIS — Z12.31 ENCOUNTER FOR SCREENING MAMMOGRAM FOR BREAST CANCER: ICD-10-CM

## 2022-05-25 PROCEDURE — 77063 BREAST TOMOSYNTHESIS BI: CPT

## 2022-05-25 PROCEDURE — 77080 DXA BONE DENSITY AXIAL: CPT

## 2022-05-25 PROCEDURE — 77067 SCR MAMMO BI INCL CAD: CPT

## 2022-06-02 ENCOUNTER — APPOINTMENT (OUTPATIENT)
Dept: LAB | Facility: CLINIC | Age: 77
End: 2022-06-02
Payer: MEDICARE

## 2022-06-02 DIAGNOSIS — E55.9 VITAMIN D DEFICIENCY: ICD-10-CM

## 2022-06-02 DIAGNOSIS — N17.9 AKI (ACUTE KIDNEY INJURY) (HCC): ICD-10-CM

## 2022-06-02 DIAGNOSIS — E03.9 HYPOTHYROIDISM, UNSPECIFIED TYPE: ICD-10-CM

## 2022-06-02 LAB
25(OH)D3 SERPL-MCNC: 110 NG/ML (ref 30–100)
ANION GAP SERPL CALCULATED.3IONS-SCNC: 3 MMOL/L (ref 4–13)
BUN SERPL-MCNC: 17 MG/DL (ref 5–25)
CALCIUM SERPL-MCNC: 9.8 MG/DL (ref 8.3–10.1)
CHLORIDE SERPL-SCNC: 105 MMOL/L (ref 100–108)
CO2 SERPL-SCNC: 29 MMOL/L (ref 21–32)
CREAT SERPL-MCNC: 1.19 MG/DL (ref 0.6–1.3)
GFR SERPL CREATININE-BSD FRML MDRD: 44 ML/MIN/1.73SQ M
GLUCOSE P FAST SERPL-MCNC: 94 MG/DL (ref 65–99)
POTASSIUM SERPL-SCNC: 4.4 MMOL/L (ref 3.5–5.3)
SODIUM SERPL-SCNC: 137 MMOL/L (ref 136–145)
TSH SERPL DL<=0.05 MIU/L-ACNC: 4.33 UIU/ML (ref 0.45–4.5)

## 2022-06-02 PROCEDURE — 84443 ASSAY THYROID STIM HORMONE: CPT

## 2022-06-02 PROCEDURE — 82306 VITAMIN D 25 HYDROXY: CPT

## 2022-06-02 PROCEDURE — 80048 BASIC METABOLIC PNL TOTAL CA: CPT

## 2022-06-02 PROCEDURE — 36415 COLL VENOUS BLD VENIPUNCTURE: CPT

## 2022-06-24 ENCOUNTER — HOSPITAL ENCOUNTER (OUTPATIENT)
Dept: MAMMOGRAPHY | Facility: HOSPITAL | Age: 77
Discharge: HOME | End: 2022-06-24

## 2022-06-24 DIAGNOSIS — Z12.31 VISIT FOR SCREENING MAMMOGRAM: ICD-10-CM

## 2022-08-04 ENCOUNTER — OFFICE VISIT (OUTPATIENT)
Dept: FAMILY MEDICINE CLINIC | Facility: CLINIC | Age: 77
End: 2022-08-04
Payer: MEDICARE

## 2022-08-04 VITALS
BODY MASS INDEX: 23.63 KG/M2 | DIASTOLIC BLOOD PRESSURE: 86 MMHG | HEART RATE: 71 BPM | OXYGEN SATURATION: 98 % | HEIGHT: 64 IN | RESPIRATION RATE: 18 BRPM | WEIGHT: 138.4 LBS | TEMPERATURE: 98.6 F | SYSTOLIC BLOOD PRESSURE: 138 MMHG

## 2022-08-04 DIAGNOSIS — N18.31 STAGE 3A CHRONIC KIDNEY DISEASE (HCC): ICD-10-CM

## 2022-08-04 DIAGNOSIS — E03.8 SUBCLINICAL HYPOTHYROIDISM: ICD-10-CM

## 2022-08-04 DIAGNOSIS — N39.46 MIXED STRESS AND URGE URINARY INCONTINENCE: ICD-10-CM

## 2022-08-04 DIAGNOSIS — E55.9 VITAMIN D DEFICIENCY: ICD-10-CM

## 2022-08-04 DIAGNOSIS — I10 PRIMARY HYPERTENSION: Primary | ICD-10-CM

## 2022-08-04 DIAGNOSIS — I63.9 CEREBROVASCULAR ACCIDENT (CVA), UNSPECIFIED MECHANISM (HCC): ICD-10-CM

## 2022-08-04 PROCEDURE — 99214 OFFICE O/P EST MOD 30 MIN: CPT | Performed by: INTERNAL MEDICINE

## 2022-08-04 NOTE — PROGRESS NOTES
[facility]        NAME: Patsy Sousa is a 65 y o  female  : 1945    MRN: [id number]  DATE: 2022  TIME: 1:10 PM    Assessment and Plan   1  Primary hypertension  -well-controlled    -   CBC and differential; Future; Expected date: 2022  -     Comprehensive metabolic panel; Future; Expected date: 2022  -     Lipid Panel with Direct LDL reflex; Future; Expected date: 2022    2  Subclinical hypothyroidism  -  Last TSH improved, repeat before follow up, she is taking levothyroxine  -   TSH, 3rd generation with Free T4 reflex; Future; Expected date: 2022    3  Cerebrovascular accident (CVA), unspecified mechanism ([address] )  -suffered left temporal and occipital stroke about a year ago, taking ASA and statin, follows with neurology   -    CBC and differential; Future; Expected date: 2022  -     Comprehensive metabolic panel; Future; Expected date: 2022  -     Lipid Panel with Direct LDL reflex; Future; Expected date: 2022    4  Stage 3a chronic kidney disease (HCC)  -     CBC and differential; Future; Expected date: 2022  -     Comprehensive metabolic panel; Future; Expected date: 2022  -     Lipid Panel with Direct LDL reflex; Future; Expected date: 2022    5  Vitamin D deficiency  -last 25OH-D high at 111, she stopped high-dose D2     -    Vitamin D 25 hydroxy; Future; Expected date: 2022    6  Mixed stress and urge urinary incontinence  - discussed lifestyle interventions, bladder training, avoiding irritants like caffeine, also discussed medications for OAB  She wants to hold off on these for now, discuss at follow up            Chief Complaint     Chief Complaint   Patient presents with    Follow-up         History of Present Illness       69yo female with HTN, history of CVA, hypothyroidism here for follow up  Doing well, no concerns today       Reports some issues with urinary incontinence, continuous leakage of urine  States that she leaks urine when standing, wakes up every 1-2 hrs at night to urinate  Denies dysuria, hesitancy or hematuria  Wears pads but not bothered by it  Seeing Gyn for uterine prolapse, has pessary and this has helped  Review of Systems   Review of Systems   Constitutional: Negative for appetite change, chills, fatigue and fever  Respiratory: Negative for cough, chest tightness and shortness of breath  Cardiovascular: Negative for chest pain, palpitations and leg swelling  Genitourinary: Positive for frequency  Negative for decreased urine volume, difficulty urinating, dysuria, hematuria and urgency  Neurological: Negative for dizziness, tremors, speech difficulty, weakness, light-headedness, numbness and headaches           Current Medications       Current Outpatient Medications:     amLODIPine (NORVASC) 10 mg tablet, Take 1 tablet (10 mg total) by mouth daily, Disp: 30 tablet, Rfl: 5    aspirin (ECOTRIN LOW STRENGTH) 81 mg EC tablet, Take 1 tablet (81 mg total) by mouth daily, Disp: 90 tablet, Rfl: 1    atorvastatin (LIPITOR) 40 mg tablet, Take 1 tablet (40 mg total) by mouth every evening, Disp: 90 tablet, Rfl: 3    conjugated estrogens (PREMARIN) vaginal cream, Insert 1 g into the vagina 3 (three) times a week, Disp: 30 g, Rfl: 1    levothyroxine 75 mcg tablet, Take 1 tablet (75 mcg total) by mouth daily in the early morning, Disp: 90 tablet, Rfl: 1    metoprolol succinate (Toprol XL) 25 mg 24 hr tablet, Take 1 tablet (25 mg total) by mouth daily, Disp: 90 tablet, Rfl: 3    Current Allergies     Allergies as of 08/04/2022    (No Known Allergies)            The following portions of the patient's history were reviewed and updated as appropriate: allergies, current medications, past family history, past medical history, past social history, past surgical history and problem list      Past Medical History:   Diagnosis Date    Hypertension        Past Surgical History: Procedure Laterality Date    APPENDECTOMY      CHOLECYSTECTOMY         Family History   Problem Relation Age of Onset    Stroke Mother     Diabetes Father     Diabetes Sister     Cancer Sister         brain cancer    No Known Problems Maternal Grandmother     No Known Problems Paternal Grandmother     No Known Problems Paternal Aunt     No Known Problems Paternal Aunt          Medications have been verified  Objective   /86   Pulse 71   Temp 98 6 °F (37 °C)   Resp 18   Ht 5' 4" (1 626 m)   Wt 62 8 kg (138 lb 6 4 oz)   LMP  (LMP Unknown)   SpO2 98%   BMI 23 76 kg/m²        Physical Exam     Physical Exam  Vitals reviewed  Constitutional:       General: She is not in acute distress  Appearance: She is normal weight  Cardiovascular:      Rate and Rhythm: Normal rate and regular rhythm  Heart sounds: Murmur heard  No friction rub  Comments: 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 [employer]  Pulmonary:      Effort: Pulmonary effort is normal  No accessory muscle usage  Breath sounds: Normal breath sounds  No wheezing, rhonchi or rales  Musculoskeletal:      Right lower leg: No edema  Left lower leg: No edema  Neurological:      Mental Status: She is alert               Results:  Lab Results   Component Value Date    SODIUM 137 06/02/2022    K 4 4 06/02/2022     06/02/2022    CO2 29 06/02/2022    BUN 17 06/02/2022    CREATININE 1 19 06/02/2022    CALCIUM 9 8 06/02/2022       No results found for: HGBA1C    Lab Results   Component Value Date    WBC 6 27 04/30/2022    HGB 12 0 04/30/2022    HCT 38 2 04/30/2022    MCV 90 04/30/2022     04/30/2022

## 2022-08-16 ENCOUNTER — PROCEDURE VISIT (OUTPATIENT)
Dept: OBGYN CLINIC | Facility: CLINIC | Age: 77
End: 2022-08-16
Payer: MEDICARE

## 2022-08-16 VITALS
DIASTOLIC BLOOD PRESSURE: 74 MMHG | HEIGHT: 64 IN | BODY MASS INDEX: 23.73 KG/M2 | WEIGHT: 139 LBS | SYSTOLIC BLOOD PRESSURE: 138 MMHG

## 2022-08-16 DIAGNOSIS — N81.3 PROCIDENTIA OF UTERUS: ICD-10-CM

## 2022-08-16 DIAGNOSIS — N95.2 ATROPHIC VAGINITIS: Primary | ICD-10-CM

## 2022-08-16 DIAGNOSIS — Z46.89 PESSARY MAINTENANCE: ICD-10-CM

## 2022-08-16 PROCEDURE — 99213 OFFICE O/P EST LOW 20 MIN: CPT | Performed by: ADVANCED PRACTICE MIDWIFE

## 2022-08-16 RX ORDER — ESTRADIOL 0.1 MG/G
1 CREAM VAGINAL WEEKLY
Qty: 42.5 G | Refills: 1 | Status: SHIPPED | OUTPATIENT
Start: 2022-08-16

## 2022-08-16 NOTE — PROGRESS NOTES
[facility] [place], [place]    Assessment/Plan:  No problem-specific Assessment & Plan notes found for this encounter  Diagnoses and all orders for this visit:    Atrophic vaginitis  -     estradiol (ESTRACE VAGINAL) 0 1 mg/g vaginal cream; Insert 1 g into the vagina once a week    Procidentia of uterus  -     estradiol (ESTRACE VAGINAL) 0 1 mg/g vaginal cream; Insert 1 g into the vagina once a week    Pessary maintenance  -     estradiol (ESTRACE VAGINAL) 0 1 mg/g vaginal cream; Insert 1 g into the vagina once a week    - Recommend that Larisa Lea continue to use vaginal estrogen as ordered  Gave written script for generic estrogen cream and a GoodRX card  - To call if pain, pressure, or pessary falls out    - RTO 3 months  Subjective:   Felipe Martinez is a 65 y o   female  CC: pessary cleaning    HPI: Larisa Lea is here for pessary cleaning and check  States that she does not feel pessary  No pain or pressure noted  She is having some incontinence and wears a pad  No problems initiating flow or feeling that she has fully emptied  Denies change in bowel pattern  No vaginal bleeding, discharge, or odor  Has decreased use of vaginal estrogen due to cost  We discussed use of Goodrx and generic script, will give her card and instructions  ROS: Review of Systems   Constitutional: Negative for activity change, appetite change, chills, fatigue and fever  Respiratory: Negative for shortness of breath  Cardiovascular: Negative for palpitations and leg swelling  Genitourinary: Negative for dysuria, frequency, urgency, vaginal bleeding, vaginal discharge and vaginal pain          Sudden loss of urine, without activity       PFSH: The following portions of the patient's history were reviewed and updated as appropriate: allergies, current medications, past family history, past medical history, obstetric history, gynecologic history, past social history, past surgical history and problem list        Objective:  /74   Ht 5' 4" (1 626 m)   Wt 63 kg (139 lb)   LMP  (LMP Unknown)   BMI 23 86 kg/m²    Physical Exam  Vitals reviewed  Pulmonary:      Effort: Pulmonary effort is normal    Genitourinary:     General: Normal vulva  Labia:         Right: No rash, tenderness or lesion  Left: No rash, tenderness or lesion  Vagina: No signs of injury  Erythema and prolapsed vaginal walls present  No vaginal discharge, tenderness or bleeding  Cervix: No friability, lesion, erythema or cervical bleeding  Comments: Number 5 Gelhorn- need to insert speculum to get grasp on Gelhorn- removed without difficulty  Tissue inspected pink, moist  Atrophy at vaginal introitus  Will recommend that she continue with vaginal estrogen cream weekly  Neurological:      Mental Status: She is oriented to person, place, and time     Psychiatric:         Mood and Affect: Mood normal          Behavior: Behavior normal

## 2022-08-22 DIAGNOSIS — I10 PRIMARY HYPERTENSION: ICD-10-CM

## 2022-08-22 RX ORDER — AMLODIPINE BESYLATE 10 MG/1
10 TABLET ORAL DAILY
Qty: 30 TABLET | Refills: 5 | Status: SHIPPED | OUTPATIENT
Start: 2022-08-22

## 2022-08-22 NOTE — TELEPHONE ENCOUNTER
Patient requesting refill(s) of: Amlodipine     Last filled: 02/21/2022  Last appt: 08/04/2022  Next appt: 11/07/2022  Pharmacy:  [facility]

## 2022-10-18 DIAGNOSIS — E03.9 HYPOTHYROIDISM, UNSPECIFIED TYPE: ICD-10-CM

## 2022-10-18 RX ORDER — LEVOTHYROXINE SODIUM 0.07 MG/1
75 TABLET ORAL
Qty: 90 TABLET | Refills: 1 | Status: SHIPPED | OUTPATIENT
Start: 2022-10-18

## 2022-10-18 NOTE — TELEPHONE ENCOUNTER
Patient requesting refill(s) of: levothyroxine 75 mcg daily    Last filled: 5/2/2022 #90 x 1  Last appt: 8/4/2022  Next appt:  11/7/2022  Pharmacy: [facility]

## 2022-11-02 ENCOUNTER — APPOINTMENT (OUTPATIENT)
Dept: LAB | Facility: CLINIC | Age: 77
End: 2022-11-02

## 2022-11-02 DIAGNOSIS — I63.9 CEREBROVASCULAR ACCIDENT (CVA), UNSPECIFIED MECHANISM (HCC): ICD-10-CM

## 2022-11-02 DIAGNOSIS — N18.31 STAGE 3A CHRONIC KIDNEY DISEASE (HCC): ICD-10-CM

## 2022-11-02 DIAGNOSIS — E55.9 VITAMIN D DEFICIENCY: ICD-10-CM

## 2022-11-02 DIAGNOSIS — I10 PRIMARY HYPERTENSION: ICD-10-CM

## 2022-11-02 DIAGNOSIS — E03.8 SUBCLINICAL HYPOTHYROIDISM: ICD-10-CM

## 2022-11-02 DIAGNOSIS — R73.9 ELEVATED BLOOD SUGAR: ICD-10-CM

## 2022-11-02 LAB
ALBUMIN SERPL BCP-MCNC: 3.3 G/DL (ref 3.5–5)
ALP SERPL-CCNC: 109 U/L (ref 46–116)
ALT SERPL W P-5'-P-CCNC: 15 U/L (ref 12–78)
ANION GAP SERPL CALCULATED.3IONS-SCNC: 5 MMOL/L (ref 4–13)
AST SERPL W P-5'-P-CCNC: 10 U/L (ref 5–45)
BASOPHILS # BLD AUTO: 0.05 THOUSANDS/ÂΜL (ref 0–0.1)
BASOPHILS NFR BLD AUTO: 1 % (ref 0–1)
BILIRUB SERPL-MCNC: 0.51 MG/DL (ref 0.2–1)
BUN SERPL-MCNC: 18 MG/DL (ref 5–25)
CALCIUM ALBUM COR SERPL-MCNC: 9.8 MG/DL (ref 8.3–10.1)
CALCIUM SERPL-MCNC: 9.2 MG/DL (ref 8.3–10.1)
CHLORIDE SERPL-SCNC: 108 MMOL/L (ref 96–108)
CHOLEST SERPL-MCNC: 131 MG/DL
CO2 SERPL-SCNC: 24 MMOL/L (ref 21–32)
CREAT SERPL-MCNC: 1.47 MG/DL (ref 0.6–1.3)
EOSINOPHIL # BLD AUTO: 0.22 THOUSAND/ÂΜL (ref 0–0.61)
EOSINOPHIL NFR BLD AUTO: 4 % (ref 0–6)
ERYTHROCYTE [DISTWIDTH] IN BLOOD BY AUTOMATED COUNT: 14.3 % (ref 11.6–15.1)
GFR SERPL CREATININE-BSD FRML MDRD: 33 ML/MIN/1.73SQ M
GLUCOSE P FAST SERPL-MCNC: 187 MG/DL (ref 65–99)
HCT VFR BLD AUTO: 33.9 % (ref 34.8–46.1)
HDLC SERPL-MCNC: 46 MG/DL
HGB BLD-MCNC: 10.9 G/DL (ref 11.5–15.4)
IMM GRANULOCYTES # BLD AUTO: 0.03 THOUSAND/UL (ref 0–0.2)
IMM GRANULOCYTES NFR BLD AUTO: 1 % (ref 0–2)
LDLC SERPL CALC-MCNC: 56 MG/DL (ref 0–100)
LYMPHOCYTES # BLD AUTO: 1.2 THOUSANDS/ÂΜL (ref 0.6–4.47)
LYMPHOCYTES NFR BLD AUTO: 22 % (ref 14–44)
MCH RBC QN AUTO: 29.9 PG (ref 26.8–34.3)
MCHC RBC AUTO-ENTMCNC: 32.2 G/DL (ref 31.4–37.4)
MCV RBC AUTO: 93 FL (ref 82–98)
MONOCYTES # BLD AUTO: 0.38 THOUSAND/ÂΜL (ref 0.17–1.22)
MONOCYTES NFR BLD AUTO: 7 % (ref 4–12)
NEUTROPHILS # BLD AUTO: 3.48 THOUSANDS/ÂΜL (ref 1.85–7.62)
NEUTS SEG NFR BLD AUTO: 65 % (ref 43–75)
NRBC BLD AUTO-RTO: 0 /100 WBCS
PLATELET # BLD AUTO: 206 THOUSANDS/UL (ref 149–390)
PMV BLD AUTO: 10 FL (ref 8.9–12.7)
POTASSIUM SERPL-SCNC: 4 MMOL/L (ref 3.5–5.3)
PROT SERPL-MCNC: 8 G/DL (ref 6.4–8.4)
RBC # BLD AUTO: 3.64 MILLION/UL (ref 3.81–5.12)
SODIUM SERPL-SCNC: 137 MMOL/L (ref 135–147)
T4 FREE SERPL-MCNC: 1.07 NG/DL (ref 0.76–1.46)
TRIGL SERPL-MCNC: 146 MG/DL
TSH SERPL DL<=0.05 MIU/L-ACNC: 8.28 UIU/ML (ref 0.45–4.5)
WBC # BLD AUTO: 5.36 THOUSAND/UL (ref 4.31–10.16)

## 2022-11-03 DIAGNOSIS — R73.9 ELEVATED BLOOD SUGAR: Primary | ICD-10-CM

## 2022-11-03 LAB
EST. AVERAGE GLUCOSE BLD GHB EST-MCNC: 105 MG/DL
HBA1C MFR BLD: 5.3 %

## 2022-11-07 ENCOUNTER — APPOINTMENT (OUTPATIENT)
Dept: LAB | Facility: CLINIC | Age: 77
End: 2022-11-07

## 2022-11-07 ENCOUNTER — OFFICE VISIT (OUTPATIENT)
Dept: FAMILY MEDICINE CLINIC | Facility: CLINIC | Age: 77
End: 2022-11-07

## 2022-11-07 VITALS
TEMPERATURE: 98.5 F | WEIGHT: 142 LBS | DIASTOLIC BLOOD PRESSURE: 68 MMHG | HEIGHT: 64 IN | OXYGEN SATURATION: 98 % | SYSTOLIC BLOOD PRESSURE: 140 MMHG | BODY MASS INDEX: 24.24 KG/M2 | HEART RATE: 88 BPM | RESPIRATION RATE: 16 BRPM

## 2022-11-07 DIAGNOSIS — E55.9 VITAMIN D DEFICIENCY: ICD-10-CM

## 2022-11-07 DIAGNOSIS — N39.46 MIXED STRESS AND URGE URINARY INCONTINENCE: ICD-10-CM

## 2022-11-07 DIAGNOSIS — R91.1 LUNG NODULE: ICD-10-CM

## 2022-11-07 DIAGNOSIS — F17.211 NICOTINE DEPENDENCE, CIGARETTES, IN REMISSION: ICD-10-CM

## 2022-11-07 DIAGNOSIS — I10 PRIMARY HYPERTENSION: ICD-10-CM

## 2022-11-07 DIAGNOSIS — N18.31 STAGE 3A CHRONIC KIDNEY DISEASE (HCC): ICD-10-CM

## 2022-11-07 DIAGNOSIS — Z12.2 ENCOUNTER FOR SCREENING FOR LUNG CANCER: ICD-10-CM

## 2022-11-07 DIAGNOSIS — D64.9 ANEMIA, UNSPECIFIED TYPE: ICD-10-CM

## 2022-11-07 DIAGNOSIS — Z00.00 ENCOUNTER FOR ANNUAL WELLNESS VISIT (AWV) IN MEDICARE PATIENT: ICD-10-CM

## 2022-11-07 DIAGNOSIS — E03.8 OTHER SPECIFIED HYPOTHYROIDISM: Primary | ICD-10-CM

## 2022-11-07 DIAGNOSIS — I63.9 CEREBROVASCULAR ACCIDENT (CVA), UNSPECIFIED MECHANISM (HCC): ICD-10-CM

## 2022-11-07 LAB — 25(OH)D3 SERPL-MCNC: 62 NG/ML (ref 30–100)

## 2022-11-07 RX ORDER — LEVOTHYROXINE SODIUM 88 UG/1
88 TABLET ORAL
Qty: 90 TABLET | Refills: 0 | Status: SHIPPED | OUTPATIENT
Start: 2022-11-07

## 2022-11-07 NOTE — PROGRESS NOTES
Assessment and Plan:     Problem List Items Addressed This Visit        Endocrine    Other specified hypothyroidism    -Recent TSH a little high, FT4 1 07, will increase levothyroxine to 88 mcg, repeat labs in 6 weeks   Relevant Medications    levothyroxine 88 mcg tablet    Other Relevant Orders    TSH, 3rd generation with Free T4 reflex       Cardiovascular and Mediastinum    Primary hypertension    -controlled   Relevant Orders    Comprehensive metabolic panel    CBC and differential    Cerebrovascular accident (CVA) (HCC)       Genitourinary    Stage 3a chronic kidney disease ([facility] [address] [facility] )    Relevant Orders    Comprehensive metabolic panel    CBC and differential    Iron Panel (Includes Ferritin, Iron Sat%, Iron, and TIBC)       Other    Vitamin D deficiency  - repeat labs pending, she did stop D3 supplementation      Mixed stress and urge urinary incontinence      Other Visit Diagnoses     Encounter for annual wellness visit (AWV) in Medicare patient    -  Primary    Lung nodule        -CT last year showed bilateral nodules, up to 4 mm, will repeat at this time due to her smoking history  Relevant Orders    CT chest wo contrast    Encounter for screening for lung cancer        Relevant Orders    CT chest wo contrast    Nicotine dependence, cigarettes, in remission        Relevant Orders    CT chest wo contrast    Anemia, unspecified type        Relevant Orders    Comprehensive metabolic panel    CBC and differential    Iron Panel (Includes Ferritin, Iron Sat%, Iron, and TIBC)          Depression Screening and Follow-up Plan: Patient was screened for depression during today's encounter  They screened negative with a PHQ-2 score of 0  Lung Cancer Screening Shared Decision Making: I discussed with her that she is a candidate for lung cancer CT screening  The following Shared Decision-Making points were covered:  1   Benefits of screening were discussed, including the rates of reduction in death from lung cancer and other causes  Harms of screening were reviewed, including false positive tests, radiation exposure levels, risks of invasive procedures, risks of complications of screening, and risk of overdiagnosis  2  I counseled on the importance of adherence to annual lung cancer LDCT screening, impact of co-morbidities, and ability or willingness to undergo diagnosis and treatment  3  I counseled on the importance of maintaining abstinence as a former smoker or was counseled on the importance of smoking cessation if a current smoker    Review of Eligibility Criteria: She meets all of the criteria for Lung Cancer Screening    - She is 65 y o    - She has 20 pack year tobacco history and is a current smoker or has quit within the past 15 years  - She presents no signs or symptoms of lung cancer    After discussion, the patient decided to elect lung cancer screening  Preventive health issues were discussed with patient, and age appropriate screening tests were ordered as noted in patient's After Visit Summary  Personalized health advice and appropriate referrals for health education or preventive services given if needed, as noted in patient's After Visit Summary  History of Present Illness:     Patient presents for a Medicare Wellness Visit    67yo female with history of stroke, HTN, hypothyroidism, here for follow up/AWV  Doing well, no major concerns today  Still having issues with urinary incontinence, both urgency and leakage when coughing/sneezing  Following closely with Gyn for uterine prolapse, doing well with pessary and vaginal estrogen  Denies dysuria or hematuria  Hypothyroidism: taking levothyroxine 75mcg daily  Reports cold intolerance  Mood/cognition stable  Stroke: no longer drives,  manages finances, still able to do cooking/housekeeping  Taking ASA and statin  Quit smoking about 3 years ago, previously ~ 1ppd for 20 years        Patient Care Team:  Len Boyd MD as PCP - General (Internal Medicine)     Review of Systems:     Review of Systems   Constitutional: Negative for chills, fatigue, fever and unexpected weight change  HENT: Negative for congestion, postnasal drip, rhinorrhea, sore throat and trouble swallowing  Eyes: Negative for pain, redness, itching and visual disturbance  Respiratory: Negative for cough, chest tightness, shortness of breath and wheezing  Cardiovascular: Negative for chest pain, palpitations and leg swelling  Gastrointestinal: Negative for abdominal pain, blood in stool, constipation, diarrhea and nausea  Endocrine: Negative for cold intolerance, heat intolerance, polydipsia and polyuria  Genitourinary: Negative for dysuria, frequency, hematuria and urgency  Musculoskeletal: Negative for arthralgias, back pain and joint swelling  Skin: Negative for rash  Neurological: Negative for dizziness, tremors, weakness, light-headedness, numbness and headaches  Psychiatric/Behavioral: Negative for confusion, dysphoric mood, hallucinations and suicidal ideas  The patient is not nervous/anxious           Problem List:     Patient Active Problem List   Diagnosis   • Primary hypertension   • Cerebrovascular accident (CVA) ([facility] )   • Memory loss   • Ambulatory dysfunction   • Quadrant anopia, left   • Vitamin D deficiency   • Other specified hypothyroidism   • Procidentia of uterus   • Mixed stress and urge urinary incontinence   • Stage 3a chronic kidney disease ([facility] [address] )   • Seborrheic keratoses      Past Medical and Surgical History:     Past Medical History:   Diagnosis Date   • Hypertension      Past Surgical History:   Procedure Laterality Date   • APPENDECTOMY     • CHOLECYSTECTOMY        Family History:     Family History   Problem Relation Age of Onset   • Stroke Mother    • Diabetes Father    • Diabetes Sister    • Cancer Sister         brain cancer   • No Known Problems Maternal Grandmother    • No Known Problems Paternal Grandmother • No Known Problems Paternal Aunt    • No Known Problems Paternal Aunt       Social History:     Social History     Socioeconomic History   • Marital status: /Civil Union     Spouse name: None   • Number of children: None   • Years of education: None   • Highest education level: None   Occupational History   • None   Tobacco Use   • Smoking status: Former Smoker     Packs/day: 0 50     Years: 40 00     Pack years: 20 00     Quit date: 2019     Years since quitting: 3 8   • Smokeless tobacco: Never Used   • Tobacco comment: Social    Vaping Use   • Vaping Use: Never used   Substance and Sexual Activity   • Alcohol use: Never   • Drug use: Never   • Sexual activity: Not Currently   Other Topics Concern   • None   Social History Narrative   • None     Social Determinants of Health     Financial Resource Strain: Low Risk    • Difficulty of Paying Living Expenses: Not hard at all   Food Insecurity: Not on file   Transportation Needs: No Transportation Needs   • Lack of Transportation (Medical): No   • Lack of Transportation (Non-Medical):  No   Physical Activity: Not on file   Stress: Not on file   Social Connections: Not on file   Intimate Partner Violence: Not on file   Housing Stability: Not on file      Medications and Allergies:     Current Outpatient Medications   Medication Sig Dispense Refill   • amLODIPine (NORVASC) 10 mg tablet Take 1 tablet (10 mg total) by mouth daily 30 tablet 5   • aspirin (ECOTRIN LOW STRENGTH) 81 mg EC tablet Take 1 tablet (81 mg total) by mouth daily 90 tablet 1   • atorvastatin (LIPITOR) 40 mg tablet Take 1 tablet (40 mg total) by mouth every evening 90 tablet 3   • conjugated estrogens (PREMARIN) vaginal cream Insert 1 g into the vagina 3 (three) times a week 30 g 1   • estradiol (ESTRACE VAGINAL) 0 1 mg/g vaginal cream Insert 1 g into the vagina once a week 42 5 g 1   • levothyroxine 88 mcg tablet Take 1 tablet (88 mcg total) by mouth daily in the early morning 90 tablet 0 • metoprolol succinate (Toprol XL) 25 mg 24 hr tablet Take 1 tablet (25 mg total) by mouth daily 90 tablet 3     No current facility-administered medications for this visit  No Known Allergies   Immunizations:     Immunization History   Administered Date(s) Administered   • COVID-19 MODERNA VACC 0 25 ML IM BOOSTER 11/08/2021   • COVID-19 MODERNA VACC 0 5 ML IM 01/21/2021, 02/17/2021   • INFLUENZA 10/20/2015, 10/03/2016, 10/26/2017, 10/12/2018, 09/21/2020, 09/27/2022   • Influenza, high dose seasonal 0 7 mL 10/18/2021      Health Maintenance:         Topic Date Due   • Hepatitis C Screening  Never done   • Lung Cancer Screening  11/05/2022         Topic Date Due   • Hepatitis A Vaccine (1 of 2 - Risk 2-dose series) Never done   • Hepatitis B Vaccine (1 of 3 - Risk 3-dose series) Never done   • Pneumococcal Vaccine: 65+ Years (1 - PCV) Never done   • COVID-19 Vaccine (4 - Booster for Javi Balder series) 03/08/2022      Medicare Screening Tests and Risk Assessments:     Quin George is here for her Subsequent Wellness visit  Health Risk Assessment:   Patient rates overall health as very good  Patient feels that their physical health rating is same  Patient is satisfied with their life  Eyesight was rated as same  Hearing was rated as same  Patient feels that their emotional and mental health rating is same  Patients states they are never, rarely angry  Patient states they are sometimes unusually tired/fatigued  Pain experienced in the last 7 days has been none  Patient states that she has experienced no weight loss or gain in last 6 months  Depression Screening:   PHQ-2 Score: 0      Fall Risk Screening: In the past year, patient has experienced: no history of falling in past year      Urinary Incontinence Screening:   Patient has leaked urine accidently in the last six months  Home Safety:  Patient has trouble with stairs inside or outside of their home   Patient has working smoke alarms and has working carbon monoxide detector  Home safety hazards include: none  Nutrition:   Current diet is Regular  Medications:   Patient is currently taking over-the-counter supplements  OTC medications include: see medication list  Patient is able to manage medications  Activities of Daily Living (ADLs)/Instrumental Activities of Daily Living (IADLs):   Walk and transfer into and out of bed and chair?: Yes  Dress and groom yourself?: Yes    Bathe or shower yourself?: Yes    Feed yourself? Yes  Do your laundry/housekeeping?: Yes  Manage your money, pay your bills and track your expenses?: No  Make your own meals?: Yes    Do your own shopping?: Yes    Previous Hospitalizations:   Any hospitalizations or ED visits within the last 12 months?: No      Advance Care Planning:   Living will: Yes    Durable POA for healthcare:  Yes    Advanced directive: Yes    Advanced directive counseling given: Yes    Five wishes given: Yes    Patient declined ACP directive: Yes    End of Life Decisions reviewed with patient: Yes    Provider agrees with end of life decisions: Yes      Comments:  and sons are POA    Cognitive Screening:   Provider or family/friend/caregiver concerned regarding cognition?: Yes    PREVENTIVE SCREENINGS      Cardiovascular Screening:    General: Screening Not Indicated and History Lipid Disorder      Diabetes Screening:     General: Screening Current      Colorectal Cancer Screening:     General: Screening Not Indicated      Breast Cancer Screening:     General: Screening Current      Cervical Cancer Screening:    General: Screening Not Indicated      Osteoporosis Screening:    General: Screening Current      Abdominal Aortic Aneurysm (AAA) Screening:        General: Screening Not Indicated      Lung Cancer Screening:     General: Risks and Benefits Discussed    Due for: Low Dose CT (LDCT)      Hepatitis C Screening:    General: Screening Not Indicated    Screening, Brief Intervention, and Referral to Treatment (SBIRT)    Screening  Typical number of drinks in a day: 0  Typical number of drinks in a week: 0  Interpretation: Low risk drinking behavior  Single Item Drug Screening:  How often have you used an illegal drug (including marijuana) or a prescription medication for non-medical reasons in the past year? never    Single Item Drug Screen Score: 0  Interpretation: Negative screen for possible drug use disorder    Brief Intervention  Alcohol & drug use screenings were reviewed  No concerns regarding substance use disorder identified  Other Counseling Topics:   Car/seat belt/driving safety, skin self-exam, sunscreen and regular weightbearing exercise and calcium and vitamin D intake  Pt no longer drives    No exam data present     Physical Exam:     /68   Pulse 88   Temp 98 5 °F (36 9 °C)   Resp 16   Ht 5' 4" (1 626 m)   Wt 64 4 kg (142 lb)   LMP  (LMP Unknown)   SpO2 98%   BMI 24 37 kg/m²     Physical Exam  Vitals reviewed  Constitutional:       General: She is not in acute distress  Appearance: She is normal weight  HENT:      Head: Normocephalic and atraumatic  Right Ear: Tympanic membrane, ear canal and external ear normal       Left Ear: Tympanic membrane, ear canal and external ear normal       Mouth/Throat:      Pharynx: No oropharyngeal exudate or posterior oropharyngeal erythema  Neck:      Vascular: No carotid bruit  Cardiovascular:      Rate and Rhythm: Normal rate and regular rhythm  Heart sounds: No murmur heard  No friction rub  No gallop  Pulmonary:      Effort: Pulmonary effort is normal  No respiratory distress  Breath sounds: Normal breath sounds  No wheezing, rhonchi or rales  Abdominal:      General: Abdomen is flat  Bowel sounds are normal  There is no distension  Palpations: Abdomen is soft  There is no mass  Tenderness: There is no abdominal tenderness  There is no guarding or rebound  Hernia: No hernia is present  Lymphadenopathy:      Cervical: No cervical adenopathy  Neurological:      General: No focal deficit present  Mental Status: She is alert  Motor: No weakness  Psychiatric:         Mood and Affect: Mood normal          Thought Content:  Thought content normal          Judgment: Judgment normal           Vidya Selby MD

## 2022-11-07 NOTE — PATIENT INSTRUCTIONS
Please increase thyroid medication to 88mcg daily  Get blood work again in 6-8 weeks          Medicare Preventive Visit Patient Instructions  Thank you for completing your Welcome to Medicare Visit or Medicare Annual Wellness Visit today  Your next wellness visit will be due in one year (11/8/2023)  The screening/preventive services that you may require over the next 5-10 years are detailed below  Some tests may not apply to you based off risk factors and/or age  Screening tests ordered at today's visit but not completed yet may show as past due  Also, please note that scanned in results may not display below  Preventive Screenings:  Service Recommendations Previous Testing/Comments   Colorectal Cancer Screening  * Colonoscopy    * Fecal Occult Blood Test (FOBT)/Fecal Immunochemical Test (FIT)  * Fecal DNA/Cologuard Test  * Flexible Sigmoidoscopy Age: 39-70 years old   Colonoscopy: every 10 years (may be performed more frequently if at higher risk)  OR  FOBT/FIT: every 1 year  OR  Cologuard: every 3 years  OR  Sigmoidoscopy: every 5 years  Screening may be recommended earlier than age 39 if at higher risk for colorectal cancer  Also, an individualized decision between you and your healthcare provider will decide whether screening between the ages of 74-80 would be appropriate  Colonoscopy: Not on file  FOBT/FIT: Not on file  Cologuard: Not on file  Sigmoidoscopy: Not on file          Breast Cancer Screening Age: 36 years old  Frequency: every 1-2 years  Not required if history of left and right mastectomy Mammogram: 05/25/2022    Screening Current   Cervical Cancer Screening Between the ages of 21-29, pap smear recommended once every 3 years  Between the ages of 33-67, can perform pap smear with HPV co-testing every 5 years     Recommendations may differ for women with a history of total hysterectomy, cervical cancer, or abnormal pap smears in past  Pap Smear: 05/09/2022    Screening Not Indicated   Hepatitis C Screening Once for adults born between 1945 and 1965  More frequently in patients at high risk for Hepatitis C Hep C Antibody: Not on file        Diabetes Screening 1-2 times per year if you're at risk for diabetes or have pre-diabetes Fasting glucose: 187 mg/dL (11/2/2022)  A1C: 5 3 % (11/2/2022)  Screening Current   Cholesterol Screening Once every 5 years if you don't have a lipid disorder  May order more often based on risk factors  Lipid panel: 11/02/2022    Screening Not Indicated  History Lipid Disorder     Other Preventive Screenings Covered by Medicare:  Abdominal Aortic Aneurysm (AAA) Screening: covered once if your at risk  You're considered to be at risk if you have a family history of AAA  Lung Cancer Screening: covers low dose CT scan once per year if you meet all of the following conditions: (1) Age 50-69; (2) No signs or symptoms of lung cancer; (3) Current smoker or have quit smoking within the last 15 years; (4) You have a tobacco smoking history of at least 20 pack years (packs per day multiplied by number of years you smoked); (5) You get a written order from a healthcare provider  Glaucoma Screening: covered annually if you're considered high risk: (1) You have diabetes OR (2) Family history of glaucoma OR (3)  aged 48 and older OR (3)  American aged 72 and older  Osteoporosis Screening: covered every 2 years if you meet one of the following conditions: (1) You're estrogen deficient and at risk for osteoporosis based off medical history and other findings; (2) Have a vertebral abnormality; (3) On glucocorticoid therapy for more than 3 months; (4) Have primary hyperparathyroidism; (5) On osteoporosis medications and need to assess response to drug therapy  Last bone density test (DXA Scan): 05/25/2022  HIV Screening: covered annually if you're between the age of 12-76   Also covered annually if you are younger than 13 and older than 72 with risk factors for HIV infection  For pregnant patients, it is covered up to 3 times per pregnancy  Immunizations:  Immunization Recommendations   Influenza Vaccine Annual influenza vaccination during flu season is recommended for all persons aged >= 6 months who do not have contraindications   Pneumococcal Vaccine   * Pneumococcal conjugate vaccine = PCV13 (Prevnar 13), PCV15 (Vaxneuvance), PCV20 (Prevnar 20)  * Pneumococcal polysaccharide vaccine = PPSV23 (Pneumovax) Adults 25-60 years old: 1-3 doses may be recommended based on certain risk factors  Adults 72 years old: 1-2 doses may be recommended based off what pneumonia vaccine you previously received   Hepatitis B Vaccine 3 dose series if at intermediate or high risk (ex: diabetes, end stage renal disease, liver disease)   Tetanus (Td) Vaccine - COST NOT COVERED BY MEDICARE PART B Following completion of primary series, a booster dose should be given every 10 years to maintain immunity against tetanus  Td may also be given as tetanus wound prophylaxis  Tdap Vaccine - COST NOT COVERED BY MEDICARE PART B Recommended at least once for all adults  For pregnant patients, recommended with each pregnancy  Shingles Vaccine (Shingrix) - COST NOT COVERED BY MEDICARE PART B  2 shot series recommended in those aged 48 and above     Health Maintenance Due:      Topic Date Due    Hepatitis C Screening  Never done    Lung Cancer Screening  11/05/2022     Immunizations Due:      Topic Date Due    Pneumococcal Vaccine: 65+ Years (1 - PCV) Never done    COVID-19 Vaccine (4 - Booster for Mary Muff series) 03/08/2022     Advance Directives   What are advance directives? Advance directives are legal documents that state your wishes and plans for medical care  These plans are made ahead of time in case you lose your ability to make decisions for yourself  Advance directives can apply to any medical decision, such as the treatments you want, and if you want to donate organs     What are the types of advance directives? There are many types of advance directives, and each state has rules about how to use them  You may choose a combination of any of the following:  Living will: This is a written record of the treatment you want  You can also choose which treatments you do not want, which to limit, and which to stop at a certain time  This includes surgery, medicine, IV fluid, and tube feedings  Durable power of  for healthcare [facility]): This is a written record that states who you want to make healthcare choices for you when you are unable to make them for yourself  This person, called a proxy, is usually a family member or a friend  You may choose more than 1 proxy  Do not resuscitate (DNR) order:  A DNR order is used in case your heart stops beating or you stop breathing  It is a request not to have certain forms of treatment, such as CPR  A DNR order may be included in other types of advance directives  Medical directive: This covers the care that you want if you are in a coma, near death, or unable to make decisions for yourself  You can list the treatments you want for each condition  Treatment may include pain medicine, surgery, blood transfusions, dialysis, IV or tube feedings, and a ventilator (breathing machine)  Values history: This document has questions about your views, beliefs, and how you feel and think about life  This information can help others choose the care that you would choose  Why are advance directives important? An advance directive helps you control your care  Although spoken wishes may be used, it is better to have your wishes written down  Spoken wishes can be misunderstood, or not followed  Treatments may be given even if you do not want them  An advance directive may make it easier for your family to make difficult choices about your care  Urinary Incontinence   Urinary incontinence (UI)  is when you lose control of your bladder   UI develops because your bladder cannot store or empty urine properly  The 3 most common types of UI are stress incontinence, urge incontinence, or both  Medicines:   May be given to help strengthen your bladder control  Report any side effects of medication to your healthcare provider  Do pelvic muscle exercises often:  Your pelvic muscles help you stop urinating  Squeeze these muscles tight for 5 seconds, then relax for 5 seconds  Gradually work up to squeezing for 10 seconds  Do 3 sets of 15 repetitions a day, or as directed  This will help strengthen your pelvic muscles and improve bladder control  Train your bladder:  Go to the bathroom at set times, such as every 2 hours, even if you do not feel the urge to go  You can also try to hold your urine when you feel the urge to go  For example, hold your urine for 5 minutes when you feel the urge to go  As that becomes easier, hold your urine for 10 minutes  Self-care:   Keep a UI record  Write down how often you leak urine and how much you leak  Make a note of what you were doing when you leaked urine  Drink liquids as directed  You may need to limit the amount of liquid you drink to help control your urine leakage  Do not drink any liquid right before you go to bed  Limit or do not have drinks that contain caffeine or alcohol  Prevent constipation  Eat a variety of high-fiber foods  Good examples are high-fiber cereals, beans, vegetables, and whole-grain breads  Walking is the best way to trigger your intestines to have a bowel movement  Exercise regularly and maintain a healthy weight  Weight loss and exercise will decrease pressure on your bladder and help you control your leakage  Use a catheter as directed  to help empty your bladder  A catheter is a tiny, plastic tube that is put into your bladder to drain your urine  Go to behavior therapy as directed  Behavior therapy may be used to help you learn to control your urge to urinate         © Copyright [address] 2018 Information is for End User's use only and may not be sold, redistributed or otherwise used for commercial purposes  All illustrations and images included in CareNotes® are the copyrighted property of [employer]  or [facility] Preventive Visit Patient Instructions  Thank you for completing your Welcome to Medicare Visit or Medicare Annual Wellness Visit today  Your next wellness visit will be due in one year (11/8/2023)  The screening/preventive services that you may require over the next 5-10 years are detailed below  Some tests may not apply to you based off risk factors and/or age  Screening tests ordered at today's visit but not completed yet may show as past due  Also, please note that scanned in results may not display below  Preventive Screenings:  Service Recommendations Previous Testing/Comments   Colorectal Cancer Screening  * Colonoscopy    * Fecal Occult Blood Test (FOBT)/Fecal Immunochemical Test (FIT)  * Fecal DNA/Cologuard Test  * Flexible Sigmoidoscopy Age: 39-70 years old   Colonoscopy: every 10 years (may be performed more frequently if at higher risk)  OR  FOBT/FIT: every 1 year  OR  Cologuard: every 3 years  OR  Sigmoidoscopy: every 5 years  Screening may be recommended earlier than age 39 if at higher risk for colorectal cancer  Also, an individualized decision between you and your healthcare provider will decide whether screening between the ages of 74-80 would be appropriate  Colonoscopy: Not on file  FOBT/FIT: Not on file  Cologuard: Not on file  Sigmoidoscopy: Not on file          Breast Cancer Screening Age: 36 years old  Frequency: every 1-2 years  Not required if history of left and right mastectomy Mammogram: 05/25/2022    Screening Current   Cervical Cancer Screening Between the ages of 21-29, pap smear recommended once every 3 years  Between the ages of 33-67, can perform pap smear with HPV co-testing every 5 years     Recommendations may differ for women with a history of total hysterectomy, cervical cancer, or abnormal pap smears in past  Pap Smear: 05/09/2022    Screening Not Indicated   Hepatitis C Screening Once for adults born between 1945 and 1965  More frequently in patients at high risk for Hepatitis C Hep C Antibody: Not on file        Diabetes Screening 1-2 times per year if you're at risk for diabetes or have pre-diabetes Fasting glucose: 187 mg/dL (11/2/2022)  A1C: 5 3 % (11/2/2022)  Screening Current   Cholesterol Screening Once every 5 years if you don't have a lipid disorder  May order more often based on risk factors  Lipid panel: 11/02/2022    Screening Not Indicated  History Lipid Disorder     Other Preventive Screenings Covered by Medicare:  Abdominal Aortic Aneurysm (AAA) Screening: covered once if your at risk  You're considered to be at risk if you have a family history of AAA  Lung Cancer Screening: covers low dose CT scan once per year if you meet all of the following conditions: (1) Age 50-69; (2) No signs or symptoms of lung cancer; (3) Current smoker or have quit smoking within the last 15 years; (4) You have a tobacco smoking history of at least 20 pack years (packs per day multiplied by number of years you smoked); (5) You get a written order from a healthcare provider  Glaucoma Screening: covered annually if you're considered high risk: (1) You have diabetes OR (2) Family history of glaucoma OR (3)  aged 48 and older OR (3)  American aged 72 and older  Osteoporosis Screening: covered every 2 years if you meet one of the following conditions: (1) You're estrogen deficient and at risk for osteoporosis based off medical history and other findings; (2) Have a vertebral abnormality; (3) On glucocorticoid therapy for more than 3 months; (4) Have primary hyperparathyroidism; (5) On osteoporosis medications and need to assess response to drug therapy  Last bone density test (DXA Scan): 05/25/2022    HIV Screening: covered annually if you're between the age of 12-76  Also covered annually if you are younger than 13 and older than 72 with risk factors for HIV infection  For pregnant patients, it is covered up to 3 times per pregnancy  Immunizations:  Immunization Recommendations   Influenza Vaccine Annual influenza vaccination during flu season is recommended for all persons aged >= 6 months who do not have contraindications   Pneumococcal Vaccine   * Pneumococcal conjugate vaccine = PCV13 (Prevnar 13), PCV15 (Vaxneuvance), PCV20 (Prevnar 20)  * Pneumococcal polysaccharide vaccine = PPSV23 (Pneumovax) Adults 25-60 years old: 1-3 doses may be recommended based on certain risk factors  Adults 72 years old: 1-2 doses may be recommended based off what pneumonia vaccine you previously received   Hepatitis B Vaccine 3 dose series if at intermediate or high risk (ex: diabetes, end stage renal disease, liver disease)   Tetanus (Td) Vaccine - COST NOT COVERED BY MEDICARE PART B Following completion of primary series, a booster dose should be given every 10 years to maintain immunity against tetanus  Td may also be given as tetanus wound prophylaxis  Tdap Vaccine - COST NOT COVERED BY MEDICARE PART B Recommended at least once for all adults  For pregnant patients, recommended with each pregnancy  Shingles Vaccine (Shingrix) - COST NOT COVERED BY MEDICARE PART B  2 shot series recommended in those aged 48 and above     Health Maintenance Due:      Topic Date Due    Hepatitis C Screening  Never done    Lung Cancer Screening  11/05/2022     Immunizations Due:      Topic Date Due    Pneumococcal Vaccine: 65+ Years (1 - PCV) Never done    COVID-19 Vaccine (4 - Booster for Judene Marleny series) 03/08/2022     Advance Directives   What are advance directives? Advance directives are legal documents that state your wishes and plans for medical care  These plans are made ahead of time in case you lose your ability to make decisions for yourself  Advance directives can apply to any medical decision, such as the treatments you want, and if you want to donate organs  What are the types of advance directives? There are many types of advance directives, and each state has rules about how to use them  You may choose a combination of any of the following:  Living will: This is a written record of the treatment you want  You can also choose which treatments you do not want, which to limit, and which to stop at a certain time  This includes surgery, medicine, IV fluid, and tube feedings  [facility] power of  for [facility]): This is a written record that states who you want to make healthcare choices for you when you are unable to make them for yourself  This person, called a proxy, is usually a family member or a friend  You may choose more than 1 proxy  Do not resuscitate (DNR) order:  A DNR order is used in case your heart stops beating or you stop breathing  It is a request not to have certain forms of treatment, such as CPR  A DNR order may be included in other types of advance directives  Medical directive: This covers the care that you want if you are in a coma, near death, or unable to make decisions for yourself  You can list the treatments you want for each condition  Treatment may include pain medicine, surgery, blood transfusions, dialysis, IV or tube feedings, and a ventilator (breathing machine)  Values history: This document has questions about your views, beliefs, and how you feel and think about life  This information can help others choose the care that you would choose  Why are advance directives important? An advance directive helps you control your care  Although spoken wishes may be used, it is better to have your wishes written down  Spoken wishes can be misunderstood, or not followed  Treatments may be given even if you do not want them   An advance directive may make it easier for your family to make difficult choices about your care  Urinary Incontinence   Urinary incontinence (UI)  is when you lose control of your bladder  UI develops because your bladder cannot store or empty urine properly  The 3 most common types of UI are stress incontinence, urge incontinence, or both  Medicines:   May be given to help strengthen your bladder control  Report any side effects of medication to your healthcare provider  Do pelvic muscle exercises often:  Your pelvic muscles help you stop urinating  Squeeze these muscles tight for 5 seconds, then relax for 5 seconds  Gradually work up to squeezing for 10 seconds  Do 3 sets of 15 repetitions a day, or as directed  This will help strengthen your pelvic muscles and improve bladder control  Train your bladder:  Go to the bathroom at set times, such as every 2 hours, even if you do not feel the urge to go  You can also try to hold your urine when you feel the urge to go  For example, hold your urine for 5 minutes when you feel the urge to go  As that becomes easier, hold your urine for 10 minutes  Self-care:   Keep a UI record  Write down how often you leak urine and how much you leak  Make a note of what you were doing when you leaked urine  Drink liquids as directed  You may need to limit the amount of liquid you drink to help control your urine leakage  Do not drink any liquid right before you go to bed  Limit or do not have drinks that contain caffeine or alcohol  Prevent constipation  Eat a variety of high-fiber foods  Good examples are high-fiber cereals, beans, vegetables, and whole-grain breads  Walking is the best way to trigger your intestines to have a bowel movement  Exercise regularly and maintain a healthy weight  Weight loss and exercise will decrease pressure on your bladder and help you control your leakage  Use a catheter as directed  to help empty your bladder  A catheter is a tiny, plastic tube that is put into your bladder to drain your urine     Go to behavior therapy as directed  Behavior therapy may be used to help you learn to control your urge to urinate  © Copyright [employer] 2018 Information is for End User's use only and may not be sold, redistributed or otherwise used for commercial purposes   All illustrations and images included in CareNotes® are the copyrighted property of [employer]  or [address] [place] [address]

## 2022-11-15 ENCOUNTER — PROCEDURE VISIT (OUTPATIENT)
Dept: OBGYN CLINIC | Facility: CLINIC | Age: 77
End: 2022-11-15

## 2022-11-15 VITALS
WEIGHT: 142.8 LBS | DIASTOLIC BLOOD PRESSURE: 78 MMHG | SYSTOLIC BLOOD PRESSURE: 126 MMHG | BODY MASS INDEX: 24.38 KG/M2 | HEIGHT: 64 IN

## 2022-11-15 DIAGNOSIS — Z46.89 PESSARY MAINTENANCE: ICD-10-CM

## 2022-11-15 DIAGNOSIS — N81.3 PROCIDENTIA OF UTERUS: Primary | ICD-10-CM

## 2022-11-15 DIAGNOSIS — N95.2 ATROPHIC VAGINITIS: ICD-10-CM

## 2022-11-15 NOTE — PROGRESS NOTES
[facility] [place], [place]    Assessment/Plan:  No problem-specific Assessment & Plan notes found for this encounter  Diagnoses and all orders for this visit:    Procidentia of uterus    Pessary maintenance    Atrophic vaginitis    - will fill script for vaginal estrogen  - tolerating pessary without difficulty  - discussed option of surgical intervention if desires  - RTO 3 months or PRN      Subjective:   Maria Elena Ramachandran is a 65 y o   female  CC: pessary cleaning    HPI: HPI: Abdias Michel is here for pessary cleaning and check  States that she does not feel pessary  No pain or pressure noted  She is having some incontinence and wears a pad  No problems initiating flow or feeling that she has fully emptied  Denies change in bowel pattern  No vaginal bleeding, discharge, or odor  Has decreased use of vaginal estrogen due to cost  We discussed use of Goodrx and generic script, will give her card and instructions  She did not fill after last visit, but recently found script and will fill and start to use  ROS: Review of Systems   Constitutional: Negative for chills, fatigue and fever  Genitourinary: Positive for frequency  Negative for difficulty urinating, dysuria, pelvic pain, vaginal bleeding, vaginal discharge and vaginal pain  PFSH: The following portions of the patient's history were reviewed and updated as appropriate: allergies, current medications, past family history, past medical history, obstetric history, gynecologic history, past social history, past surgical history and problem list        Objective:  /78   Ht 5' 4" (1 626 m)   Wt 64 8 kg (142 lb 12 8 oz)   LMP  (LMP Unknown)   BMI 24 51 kg/m²    Physical Exam  Vitals reviewed  Genitourinary:     General: Normal vulva  Exam position: Lithotomy position  Labia:         Right: No rash, tenderness or lesion  Left: No rash, tenderness or lesion         Vagina: Erythema present  No vaginal discharge, tenderness or bleeding  Cervix: No cervical motion tenderness, friability, lesion or erythema  Comments: 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

## 2022-11-22 ENCOUNTER — HOSPITAL ENCOUNTER (OUTPATIENT)
Dept: CT IMAGING | Facility: HOSPITAL | Age: 77
Discharge: HOME | End: 2022-11-22
Attending: INTERNAL MEDICINE

## 2022-11-22 DIAGNOSIS — F17.211 NICOTINE DEPENDENCE, CIGARETTES, IN REMISSION: ICD-10-CM

## 2022-11-22 DIAGNOSIS — Z12.2 ENCOUNTER FOR SCREENING FOR LUNG CANCER: ICD-10-CM

## 2022-11-22 DIAGNOSIS — R91.1 LUNG NODULE: ICD-10-CM

## 2023-01-30 DIAGNOSIS — E03.8 OTHER SPECIFIED HYPOTHYROIDISM: ICD-10-CM

## 2023-01-30 RX ORDER — LEVOTHYROXINE SODIUM 88 UG/1
88 TABLET ORAL
Qty: 90 TABLET | Refills: 3 | Status: SHIPPED | OUTPATIENT
Start: 2023-01-30

## 2023-01-30 NOTE — TELEPHONE ENCOUNTER
Patient requesting refill(s) of: levothyroxine 88 mcg daily    Last filled: 11/7/2022 #90 x 0  Last appt: 11/7/2022  Next appt: 5/9/2023  Pharmacy: [facility]

## 2023-02-15 ENCOUNTER — TELEPHONE (OUTPATIENT)
Dept: FAMILY MEDICINE CLINIC | Facility: CLINIC | Age: 78
End: 2023-02-15

## 2023-02-15 DIAGNOSIS — U07.1 COVID: Primary | ICD-10-CM

## 2023-02-15 DIAGNOSIS — I10 PRIMARY HYPERTENSION: ICD-10-CM

## 2023-02-15 DIAGNOSIS — R05.1 ACUTE COUGH: ICD-10-CM

## 2023-02-15 RX ORDER — AMLODIPINE BESYLATE 10 MG/1
10 TABLET ORAL DAILY
Qty: 90 TABLET | Refills: 3 | Status: SHIPPED | OUTPATIENT
Start: 2023-02-15

## 2023-02-15 NOTE — TELEPHONE ENCOUNTER
Patient requesting refill(s) of: Amlodipine 10mg    Last filled: 8/22/22 #30 x5  Last appt: 11/7/22  Next appt: 5/9/23  Pharmacy: [facility]

## 2023-02-15 NOTE — TELEPHONE ENCOUNTER
Spoke with patient  Started coughing last night and wants to r/o covid  No other symptoms  Order placed  She will come to office today for swab

## 2023-02-16 DIAGNOSIS — U07.1 COVID-19: Primary | ICD-10-CM

## 2023-02-16 LAB — SARS-COV-2 RNA RESP QL NAA+PROBE: POSITIVE

## 2023-02-16 RX ORDER — NIRMATRELVIR AND RITONAVIR 150-100 MG
2 KIT ORAL 2 TIMES DAILY
Qty: 20 TABLET | Refills: 0 | Status: SHIPPED | OUTPATIENT
Start: 2023-02-16 | End: 2023-02-21

## 2023-03-21 ENCOUNTER — PROCEDURE VISIT (OUTPATIENT)
Dept: OBGYN CLINIC | Facility: CLINIC | Age: 78
End: 2023-03-21

## 2023-03-21 VITALS
BODY MASS INDEX: 24.28 KG/M2 | HEIGHT: 64 IN | WEIGHT: 142.2 LBS | DIASTOLIC BLOOD PRESSURE: 66 MMHG | SYSTOLIC BLOOD PRESSURE: 118 MMHG

## 2023-03-21 DIAGNOSIS — N95.2 ATROPHIC VAGINITIS: ICD-10-CM

## 2023-03-21 DIAGNOSIS — N81.3 PROCIDENTIA OF UTERUS: ICD-10-CM

## 2023-03-21 DIAGNOSIS — Z46.89 PESSARY MAINTENANCE: ICD-10-CM

## 2023-03-21 DIAGNOSIS — R15.9 INCONTINENCE OF FECES, UNSPECIFIED FECAL INCONTINENCE TYPE: Primary | ICD-10-CM

## 2023-03-21 RX ORDER — ESTRADIOL 0.1 MG/G
1 CREAM VAGINAL WEEKLY
Qty: 42.5 G | Refills: 1 | Status: SHIPPED | OUTPATIENT
Start: 2023-03-21

## 2023-03-21 NOTE — PROGRESS NOTES
[facility] [address], [place]    Assessment/Plan:  No problem-specific Assessment & Plan notes found for this encounter  Diagnoses and all orders for this visit:    Incontinence of feces, unspecified fecal incontinence type  -     Ambulatory Referral to Gastroenterology; Future    Atrophic vaginitis  -     estradiol (ESTRACE VAGINAL) 0 1 mg/g vaginal cream; Insert 1 g into the vagina once a week    Procidentia of uterus  -     estradiol (ESTRACE VAGINAL) 0 1 mg/g vaginal cream; Insert 1 g into the vagina once a week    Pessary maintenance  -     estradiol (ESTRACE VAGINAL) 0 1 mg/g vaginal cream; Insert 1 g into the vagina once a week    - will try vaginal estrogen if able to purchase  - to call if any bleeding or pelvic discomfort  - RTO 3 months  Subjective:   Nadine Qureshi is a 65 y o   female  CC: 3 month pessary checkup    HPI: Dennise Tellez states that she and her  had Covid last month  She has since recovered and is feeling better  Denniselisa Tellez had hx of complete procidentia, 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  Current pessary is a number 5 Gellhorn  She is not using the vaginal estrogen cream due to cost, but states that she would like to try and would like another script  Has been having incontinence of stool on a regular basis for the past few months, but was embarrassed to tell providers  Does not have rectal pain or pressure, but states that she does not realize that she has had a bowel movement  She had a CVA in 2022 at which time she was also noted to have a prolapse of the uterus and onset of some bladder leakage  She had control of bowel until recently  States that she has never had a colonoscopy  Denies change in diet and still notes some urinary leaking  She does not feel the pessary and does not have any problems with emptying bladder or pain with bowel movement         ROS: Review of Systems   Constitutional: Negative for chills, fatigue and fever  Gastrointestinal: Negative for constipation and diarrhea  Incontinence of stool  Genitourinary: Negative for difficulty urinating, dysuria, frequency, pelvic pain, urgency, vaginal bleeding, vaginal discharge and vaginal pain  PFSH: The following portions of the patient's history were reviewed and updated as appropriate: allergies, current medications, past family history, past medical history, obstetric history, gynecologic history, past social history, past surgical history and problem list        Objective:  /66   Ht 5' 4" (1 626 m)   Wt 64 5 kg (142 lb 3 2 oz)   LMP  (LMP Unknown)   BMI 24 41 kg/m²    Physical Exam  Vitals reviewed  Pulmonary:      Effort: Pulmonary effort is normal    Genitourinary:     General: Normal vulva  Exam position: Lithotomy position  Labia:         Right: No tenderness or lesion  Left: No tenderness or lesion  Vagina: No vaginal discharge, erythema, tenderness, bleeding or lesions  Cervix: No discharge, friability, lesion or erythema  Comments: Small amount of stool noted rectal and vaginal area  Cleaned area with Hibiclens  Number 5 Gellhorn- grasped with ring forceps and removed without difficulty  Speculum exam: no lesions, erosions, or discharge  Gellhorn inserted into vagina, no complaints of pain or discomfort  Neurological:      Mental Status: She is alert and oriented to person, place, and time     Psychiatric:         Mood and Affect: Mood normal          Behavior: Behavior normal

## 2023-05-01 DIAGNOSIS — I10 PRIMARY HYPERTENSION: ICD-10-CM

## 2023-05-01 RX ORDER — METOPROLOL SUCCINATE 25 MG/1
25 TABLET, EXTENDED RELEASE ORAL DAILY
Qty: 90 TABLET | Refills: 3 | Status: SHIPPED | OUTPATIENT
Start: 2023-05-01

## 2023-05-01 NOTE — TELEPHONE ENCOUNTER
Patient requesting refill(s) of: Metoprolol     Last filled: 4/22/22  Last appt: 11/7/22  Next appt: 5/9/23  Pharmacy: [facility]

## 2023-05-03 ENCOUNTER — RA CDI HCC (OUTPATIENT)
Dept: OTHER | Facility: HOSPITAL | Age: 78
End: 2023-05-03

## 2023-05-03 NOTE — PROGRESS NOTES
[address]  coding opportunities       Chart reviewed, no opportunity found: CHART REVIEWED, NO OPPORTUNITY FOUND        Patients Insurance     Medicare Insurance: Medicare

## 2023-05-09 ENCOUNTER — OFFICE VISIT (OUTPATIENT)
Dept: FAMILY MEDICINE CLINIC | Facility: CLINIC | Age: 78
End: 2023-05-09

## 2023-05-09 VITALS
HEART RATE: 75 BPM | DIASTOLIC BLOOD PRESSURE: 70 MMHG | OXYGEN SATURATION: 98 % | HEIGHT: 64 IN | SYSTOLIC BLOOD PRESSURE: 128 MMHG | RESPIRATION RATE: 18 BRPM | BODY MASS INDEX: 24.45 KG/M2 | TEMPERATURE: 97.1 F | WEIGHT: 143.2 LBS

## 2023-05-09 DIAGNOSIS — E55.9 VITAMIN D DEFICIENCY: ICD-10-CM

## 2023-05-09 DIAGNOSIS — N18.31 STAGE 3A CHRONIC KIDNEY DISEASE (HCC): ICD-10-CM

## 2023-05-09 DIAGNOSIS — E03.8 OTHER SPECIFIED HYPOTHYROIDISM: ICD-10-CM

## 2023-05-09 DIAGNOSIS — I10 PRIMARY HYPERTENSION: Primary | ICD-10-CM

## 2023-05-09 NOTE — PATIENT INSTRUCTIONS
Please get blood work and urine studies done at this time  Follow up in 6 months for annual exam  Try to limit dietary salt intake

## 2023-05-09 NOTE — PROGRESS NOTES
[facility]        NAME: Savita Beltrán is a 65 y o  female  : 1945    MRN: [id number]  DATE: May 10, 2023  TIME: 2:27 PM    Assessment and Plan   1  Primary hypertension  -     CBC and differential; Future  -     Comprehensive metabolic panel; Future  -     Vitamin D 25 hydroxy; Future  -     PTH, intact; Future  -     Urinalysis with microscopic  -     Protein / creatinine ratio, urine  -     Albumin / creatinine urine ratio    2  Stage 3a chronic kidney disease (HCC)  -     CBC and differential; Future  -     Comprehensive metabolic panel; Future  -     Vitamin D 25 hydroxy; Future  -     PTH, intact; Future  -     Urinalysis with microscopic  -     Protein / creatinine ratio, urine  -     Albumin / creatinine urine ratio    3  Other specified hypothyroidism  -     CBC and differential; Future  -     Comprehensive metabolic panel; Future  -     TSH, 3rd generation with Free T4 reflex; Future    4  Vitamin D deficiency  -     CBC and differential; Future  -     Comprehensive metabolic panel; Future  -     Vitamin D 25 hydroxy; Future           Chief Complaint     Chief Complaint   Patient presents with   • Follow-up         History of Present Illness       69yo female with history of HTN, stroke, MCI, hypothyroidism here for 6 month follow up  Doing well, no concerns today  Denies chest pain, dizziness, SOB or falls  Review of Systems   Review of Systems   Constitutional: Negative for appetite change, chills, fatigue and unexpected weight change  Respiratory: Negative for chest tightness and shortness of breath  Cardiovascular: Positive for leg swelling  Negative for chest pain and palpitations  Genitourinary: Negative for decreased urine volume, pelvic pain and urgency  Neurological: Negative for dizziness, weakness, light-headedness, numbness and headaches           Current Medications       Current Outpatient Medications:   •  amLODIPine (NORVASC) 10 mg tablet, Take 1 "tablet (10 mg total) by mouth daily, Disp: 90 tablet, Rfl: 3  •  aspirin (ECOTRIN LOW STRENGTH) 81 mg EC tablet, Take 1 tablet (81 mg total) by mouth daily, Disp: 90 tablet, Rfl: 1  •  atorvastatin (LIPITOR) 40 mg tablet, Take 1 tablet (40 mg total) by mouth every evening, Disp: 90 tablet, Rfl: 3  •  conjugated estrogens (PREMARIN) vaginal cream, Insert 1 g into the vagina 3 (three) times a week, Disp: 30 g, Rfl: 1  •  estradiol (ESTRACE VAGINAL) 0 1 mg/g vaginal cream, Insert 1 g into the vagina once a week, Disp: 42 5 g, Rfl: 1  •  levothyroxine 88 mcg tablet, Take 1 tablet (88 mcg total) by mouth daily in the early morning, Disp: 90 tablet, Rfl: 3  •  metoprolol succinate (Toprol XL) 25 mg 24 hr tablet, Take 1 tablet (25 mg total) by mouth daily, Disp: 90 tablet, Rfl: 3    Current Allergies     Allergies as of 05/09/2023   • (No Known Allergies)            The following portions of the patient's history were reviewed and updated as appropriate: allergies, current medications, past family history, past medical history, past social history, past surgical history and problem list      Past Medical History:   Diagnosis Date   • Hypertension        Past Surgical History:   Procedure Laterality Date   • APPENDECTOMY     • CHOLECYSTECTOMY         Family History   Problem Relation Age of Onset   • Stroke Mother    • Diabetes Father    • Diabetes Sister    • Cancer Sister         brain cancer   • No Known Problems Maternal Grandmother    • No Known Problems Paternal Grandmother    • No Known Problems Paternal Aunt    • No Known Problems Paternal Aunt          Medications have been verified  Objective   /70   Pulse 75   Temp (!) 97 1 °F (36 2 °C)   Resp 18   Ht 5' 4\" (1 626 m)   Wt 65 kg (143 lb 3 2 oz)   LMP  (LMP Unknown)   SpO2 98%   BMI 24 58 kg/m²        Physical Exam     Physical Exam  Vitals reviewed  Constitutional:       General: She is not in acute distress       Appearance: She is normal " weight  Cardiovascular:      Rate and Rhythm: Normal rate and regular rhythm  Heart sounds: Murmur heard  No friction rub  No gallop  Comments: 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 [facility]  Pulmonary:      Effort: Pulmonary effort is normal  No accessory muscle usage  Breath sounds: Normal breath sounds  No wheezing, rhonchi or rales  Musculoskeletal:      Right lower le+ Pitting Edema present  Left lower le+ Pitting Edema present  Neurological:      Mental Status: She is alert  Motor: Motor function is intact  No weakness or tremor  Gait: Gait is intact  Psychiatric:         Mood and Affect: Mood and affect normal          Speech: Speech normal          Behavior: Behavior normal  Behavior is cooperative  Cognition and Memory: Memory normal  Cognition is impaired               Results:  Lab Results   Component Value Date    SODIUM 137 2022    K 4 0 2022     2022    CO2 24 2022    BUN 18 2022    CREATININE 1 47 (H) 2022    CALCIUM 9 2 2022       Lab Results   Component Value Date    HGBA1C 5 3 2022       Lab Results   Component Value Date    WBC 5 36 2022    HGB 10 9 (L) 2022    HCT 33 9 (L) 2022    MCV 93 2022     2022

## 2023-05-11 ENCOUNTER — APPOINTMENT (OUTPATIENT)
Dept: LAB | Facility: CLINIC | Age: 78
End: 2023-05-11

## 2023-05-11 DIAGNOSIS — I10 PRIMARY HYPERTENSION: ICD-10-CM

## 2023-05-11 DIAGNOSIS — E55.9 VITAMIN D DEFICIENCY: ICD-10-CM

## 2023-05-11 DIAGNOSIS — N18.31 STAGE 3A CHRONIC KIDNEY DISEASE (HCC): ICD-10-CM

## 2023-05-11 DIAGNOSIS — D64.9 ANEMIA, UNSPECIFIED TYPE: ICD-10-CM

## 2023-05-11 DIAGNOSIS — E03.8 OTHER SPECIFIED HYPOTHYROIDISM: ICD-10-CM

## 2023-05-11 LAB
25(OH)D3 SERPL-MCNC: 46 NG/ML (ref 30–100)
ALBUMIN SERPL BCP-MCNC: 3.7 G/DL (ref 3.5–5)
ALP SERPL-CCNC: 115 U/L (ref 46–116)
ALT SERPL W P-5'-P-CCNC: 16 U/L (ref 12–78)
ANION GAP SERPL CALCULATED.3IONS-SCNC: 1 MMOL/L (ref 4–13)
AST SERPL W P-5'-P-CCNC: 13 U/L (ref 5–45)
BASOPHILS # BLD AUTO: 0.03 THOUSANDS/ÂΜL (ref 0–0.1)
BASOPHILS NFR BLD AUTO: 1 % (ref 0–1)
BILIRUB SERPL-MCNC: 0.55 MG/DL (ref 0.2–1)
BUN SERPL-MCNC: 23 MG/DL (ref 5–25)
CALCIUM SERPL-MCNC: 9.2 MG/DL (ref 8.3–10.1)
CHLORIDE SERPL-SCNC: 107 MMOL/L (ref 96–108)
CO2 SERPL-SCNC: 26 MMOL/L (ref 21–32)
CREAT SERPL-MCNC: 1.28 MG/DL (ref 0.6–1.3)
EOSINOPHIL # BLD AUTO: 0.21 THOUSAND/ÂΜL (ref 0–0.61)
EOSINOPHIL NFR BLD AUTO: 4 % (ref 0–6)
ERYTHROCYTE [DISTWIDTH] IN BLOOD BY AUTOMATED COUNT: 14.8 % (ref 11.6–15.1)
FERRITIN SERPL-MCNC: 23 NG/ML (ref 8–388)
GFR SERPL CREATININE-BSD FRML MDRD: 40 ML/MIN/1.73SQ M
GLUCOSE P FAST SERPL-MCNC: 113 MG/DL (ref 65–99)
HCT VFR BLD AUTO: 35 % (ref 34.8–46.1)
HGB BLD-MCNC: 11.3 G/DL (ref 11.5–15.4)
IMM GRANULOCYTES # BLD AUTO: 0.03 THOUSAND/UL (ref 0–0.2)
IMM GRANULOCYTES NFR BLD AUTO: 1 % (ref 0–2)
IRON SATN MFR SERPL: 18 % (ref 15–50)
IRON SERPL-MCNC: 57 UG/DL (ref 50–170)
LYMPHOCYTES # BLD AUTO: 1.34 THOUSANDS/ÂΜL (ref 0.6–4.47)
LYMPHOCYTES NFR BLD AUTO: 23 % (ref 14–44)
MCH RBC QN AUTO: 29.2 PG (ref 26.8–34.3)
MCHC RBC AUTO-ENTMCNC: 32.3 G/DL (ref 31.4–37.4)
MCV RBC AUTO: 90 FL (ref 82–98)
MONOCYTES # BLD AUTO: 0.47 THOUSAND/ÂΜL (ref 0.17–1.22)
MONOCYTES NFR BLD AUTO: 8 % (ref 4–12)
NEUTROPHILS # BLD AUTO: 3.71 THOUSANDS/ÂΜL (ref 1.85–7.62)
NEUTS SEG NFR BLD AUTO: 63 % (ref 43–75)
NRBC BLD AUTO-RTO: 0 /100 WBCS
PLATELET # BLD AUTO: 198 THOUSANDS/UL (ref 149–390)
PMV BLD AUTO: 9.7 FL (ref 8.9–12.7)
POTASSIUM SERPL-SCNC: 4.3 MMOL/L (ref 3.5–5.3)
PROT SERPL-MCNC: 8.2 G/DL (ref 6.4–8.4)
PTH-INTACT SERPL-MCNC: 72.1 PG/ML (ref 18.4–80.1)
RBC # BLD AUTO: 3.87 MILLION/UL (ref 3.81–5.12)
SODIUM SERPL-SCNC: 134 MMOL/L (ref 135–147)
TIBC SERPL-MCNC: 318 UG/DL (ref 250–450)
TSH SERPL DL<=0.05 MIU/L-ACNC: 3.36 UIU/ML (ref 0.45–4.5)
WBC # BLD AUTO: 5.79 THOUSAND/UL (ref 4.31–10.16)

## 2023-05-14 DIAGNOSIS — D64.9 ANEMIA, UNSPECIFIED TYPE: Primary | ICD-10-CM

## 2023-05-17 ENCOUNTER — APPOINTMENT (OUTPATIENT)
Dept: LAB | Facility: CLINIC | Age: 78
End: 2023-05-17

## 2023-05-17 LAB
BACTERIA UR QL AUTO: ABNORMAL /HPF
BILIRUB UR QL STRIP: NEGATIVE
CLARITY UR: CLEAR
COLOR UR: ABNORMAL
CREAT UR-MCNC: 46.3 MG/DL
CREAT UR-MCNC: 46.3 MG/DL
GLUCOSE UR STRIP-MCNC: NEGATIVE MG/DL
HGB UR QL STRIP.AUTO: NEGATIVE
KETONES UR STRIP-MCNC: NEGATIVE MG/DL
LEUKOCYTE ESTERASE UR QL STRIP: NEGATIVE
MICROALBUMIN UR-MCNC: 213 MG/L (ref 0–20)
MICROALBUMIN/CREAT 24H UR: 460 MG/G CREATININE (ref 0–30)
NITRITE UR QL STRIP: NEGATIVE
NON-SQ EPI CELLS URNS QL MICRO: ABNORMAL /HPF
PH UR STRIP.AUTO: 6 [PH]
PROT UR STRIP-MCNC: ABNORMAL MG/DL
PROT UR-MCNC: 29 MG/DL
PROT/CREAT UR: 0.63 MG/G{CREAT} (ref 0–0.1)
RBC #/AREA URNS AUTO: ABNORMAL /HPF
SP GR UR STRIP.AUTO: 1.01 (ref 1–1.03)
UROBILINOGEN UR STRIP-ACNC: <2 MG/DL
WBC #/AREA URNS AUTO: ABNORMAL /HPF

## 2023-06-07 DIAGNOSIS — Z12.31 BREAST CANCER SCREENING BY MAMMOGRAM: Primary | ICD-10-CM

## 2023-06-20 ENCOUNTER — PROCEDURE VISIT (OUTPATIENT)
Dept: OBGYN CLINIC | Facility: CLINIC | Age: 78
End: 2023-06-20
Payer: MEDICARE

## 2023-06-20 VITALS
WEIGHT: 146.2 LBS | SYSTOLIC BLOOD PRESSURE: 134 MMHG | BODY MASS INDEX: 24.96 KG/M2 | HEIGHT: 64 IN | DIASTOLIC BLOOD PRESSURE: 72 MMHG

## 2023-06-20 DIAGNOSIS — R15.9 INCONTINENCE OF FECES, UNSPECIFIED FECAL INCONTINENCE TYPE: ICD-10-CM

## 2023-06-20 DIAGNOSIS — N81.3 PROCIDENTIA OF UTERUS: ICD-10-CM

## 2023-06-20 DIAGNOSIS — Z46.89 PESSARY MAINTENANCE: Primary | ICD-10-CM

## 2023-06-20 DIAGNOSIS — N39.46 MIXED STRESS AND URGE URINARY INCONTINENCE: ICD-10-CM

## 2023-06-20 PROCEDURE — 99213 OFFICE O/P EST LOW 20 MIN: CPT | Performed by: ADVANCED PRACTICE MIDWIFE

## 2023-06-20 NOTE — PROGRESS NOTES
[facility] [address] [place], [place]    Assessment/Plan:  No problem-specific Assessment & Plan notes found for this encounter  Diagnoses and all orders for this visit:    Pessary maintenance    Procidentia of uterus    Mixed stress and urge urinary incontinence    Incontinence of feces, unspecified fecal incontinence type    - RTO 3 months for cleaning  - Has mammogram scheduled      Subjective:   Alonso Alva is a 65 y o   female  CC: pessary check    HPI: Ismael Gentleman is here for pessary cleaning  She does not have any discomfort from the pessary and continues to use estradiol cream 1 gram weekly  She continues to have stress incontinence and wears a pad  She is also having loss of bowel function and has an appointment with Dr Belgica Murillo in July  Does not have rectal pain or pressure, but states that she does not realize that she has had a bowel movement  She had a CVA in 2022 at which time she was also noted to have a prolapse of the uterus and onset of some bladder leakage  She had control of bowel until recently  States that she has never had a colonoscopy  Denies change in diet and still notes some urinary leaking  She does not feel the pessary and does not have any problems with emptying bladder or pain with bowel movement  She declines referral to uro-gyn for eval and possible surgical intervention  ROS: Review of Systems   Constitutional: Negative for chills, fatigue and fever  Gastrointestinal: Negative for abdominal pain  Incontinence of stool   Genitourinary: Positive for frequency and urgency  Negative for difficulty urinating, pelvic pain, vaginal bleeding, vaginal discharge and vaginal pain          Incontinence of urine       PFSH: The following portions of the patient's history were reviewed and updated as appropriate: allergies, current medications, past family history, past medical history, obstetric history, gynecologic history, past social "history, past surgical history and problem list        Objective:  /72   Ht 5' 4\" (1 626 m)   Wt 66 3 kg (146 lb 3 2 oz)   LMP  (LMP Unknown)   BMI 25 10 kg/m²    Physical Exam  Pulmonary:      Effort: Pulmonary effort is normal    Genitourinary:     General: Normal vulva  Exam position: Lithotomy position  Labia:         Right: No rash, tenderness or lesion  Left: No rash, tenderness or lesion  Vagina: No signs of injury  No vaginal discharge, erythema, tenderness, bleeding or lesions  Cervix: No discharge, friability, lesion, erythema or cervical bleeding  Comments: Number 5 Gellhorn- grasped with ring forceps and removed without difficulty  Speculum exam: no lesions, erosions, or discharge  Gellhorn inserted into vagina, no complaints of pain or discomfort  Neurological:      General: No focal deficit present  Mental Status: She is alert and oriented to person, place, and time     Psychiatric:         Mood and Affect: Mood normal            "

## 2023-06-26 ENCOUNTER — HOSPITAL ENCOUNTER (OUTPATIENT)
Dept: MAMMOGRAPHY | Facility: HOSPITAL | Age: 78
Discharge: HOME | End: 2023-06-26
Attending: INTERNAL MEDICINE
Payer: MEDICARE

## 2023-06-26 VITALS — WEIGHT: 146.2 LBS | BODY MASS INDEX: 24.96 KG/M2 | HEIGHT: 64 IN

## 2023-06-26 DIAGNOSIS — Z12.31 BREAST CANCER SCREENING BY MAMMOGRAM: ICD-10-CM

## 2023-06-26 PROCEDURE — 77067 SCR MAMMO BI INCL CAD: CPT

## 2023-06-26 PROCEDURE — 77063 BREAST TOMOSYNTHESIS BI: CPT

## 2023-07-01 NOTE — PROGRESS NOTES
[facility]        NAME: Danish Yang is a 67 y o  female  : 1945    MRN: [id number]  DATE: May 2, 2022  TIME: 12:05 PM    Assessment and Plan   1  Primary hypertension  - well-controlled with amlodipine and metoprolol    2  Cerebrovascular accident (CVA), unspecified mechanism ([facility] [address] )  - left temporal and occipital stroke last year, follows with neurology, taking aspirin and lipitor, LDL significantly improved at 74    3  Mixed stress and urge urinary incontinence  - discussed regular voiding schedule, check urine studies today to rule out UTI, discussed possible trial of medication for OAB    -   Urinalysis with microscopic  -     Urine culture; Future  -     Urine culture    4  Vitamin D deficiency  - stop high-dose D2, as 25OH-D is elevated at 135, repeat levels in 3 months  Vitamin D 25 hydroxy; Future; Expected date: 2022    5  JASBIR (acute kidney injury) ([facility] [address] )  - Cr 1 25, compared to 0 86 in August  Will check US, urine studies, repeat labs in 1 month, she is not taking NSAIDs or other nephrotoxins   -    Urinalysis with microscopic  -     Urine culture; Future  -     US kidney and bladder; Future; Expected date: 2022  -     Basic metabolic panel; Future; Expected date: 2022  -     Urine culture    6  Hypothyroidism, unspecified type  -TSH elevated at 16, she is taking levothyroxine, will increase to 75mcg, repeat TSH in 6 weeks  -    levothyroxine 75 mcg tablet; Take 1 tablet (75 mcg total) by mouth daily in the early morning  -     TSH, 3rd generation with Free T4 reflex; Future; Expected date: 2022    7  Seborrheic keratoses  - papules on abdomen, one of which has some discoloration and irregularity, discussed skin biopsy with general surgery  -     Ambulatory Referral to General Surgery;  Future             Chief Complaint     Chief Complaint   Patient presents with    Follow-up         History of Present Illness       67yo female with history of HTN, CVA, hypothyroidism here for 3 month follow up  Doing well, main concern today is issues with urinary incontinence  Reports leakage with coughing/sneezing as well as upon standing  Trying to urinate hourly but sometimes holds it if she's busy with a task, then notices leakage  Has to wear pads daily  HTN: taking amlodipine and metoprolol, denies headaches/dizziness  Hypothyroidism: taking levothyroxine 50mcg daily in the early morning  Review of Systems   Review of Systems   Constitutional: Negative for appetite change, chills, fatigue, fever and unexpected weight change  Respiratory: Negative for cough, chest tightness and shortness of breath  Cardiovascular: Negative for chest pain, palpitations and leg swelling  Gastrointestinal: Negative for abdominal pain, diarrhea, nausea and vomiting  Genitourinary: Positive for frequency and urgency  Negative for decreased urine volume, difficulty urinating, dysuria and hematuria  Neurological: Negative for dizziness, light-headedness and headaches           Current Medications       Current Outpatient Medications:     amLODIPine (NORVASC) 10 mg tablet, Take 1 tablet (10 mg total) by mouth daily, Disp: 30 tablet, Rfl: 5    aspirin (ECOTRIN LOW STRENGTH) 81 mg EC tablet, Take 1 tablet (81 mg total) by mouth daily, Disp: 90 tablet, Rfl: 1    atorvastatin (LIPITOR) 40 mg tablet, Take 1 tablet (40 mg total) by mouth every evening, Disp: 90 tablet, Rfl: 3    conjugated estrogens (PREMARIN) vaginal cream, Insert 1 g into the vagina 3 (three) times a week, Disp: 30 g, Rfl: 1    levothyroxine 75 mcg tablet, Take 1 tablet (75 mcg total) by mouth daily in the early morning, Disp: 90 tablet, Rfl: 1    metoprolol succinate (Toprol XL) 25 mg 24 hr tablet, Take 1 tablet (25 mg total) by mouth daily, Disp: 90 tablet, Rfl: 3    Current Allergies     Allergies as of 05/02/2022    (No Known Allergies)            The following portions of the patient's history were reviewed and updated as appropriate: allergies, current medications, past family history, past medical history, past social history, past surgical history and problem list      Past Medical History:   Diagnosis Date    Hypertension        Past Surgical History:   Procedure Laterality Date    APPENDECTOMY      CHOLECYSTECTOMY         Family History   Problem Relation Age of Onset    Stroke Mother     Diabetes Father     Diabetes Sister     Cancer Sister         brain cancer         Medications have been verified  Objective   /68 (BP Location: Left arm, Patient Position: Sitting, Cuff Size: Standard)   Pulse 72   Temp 97 6 °F (36 4 °C)   Resp 18   Ht 5' 4" (1 626 m)   Wt 61 2 kg (135 lb)   LMP  (LMP Unknown)   SpO2 98%   BMI 23 17 kg/m²        Physical Exam     Physical Exam  Vitals reviewed  Constitutional:       General: She is not in acute distress  Appearance: She is normal weight  Cardiovascular:      Rate and Rhythm: Normal rate and regular rhythm  Heart sounds: S1 normal and S2 normal  No murmur heard  No friction rub  No gallop  Pulmonary:      Effort: Pulmonary effort is normal  No accessory muscle usage  Breath sounds: Normal breath sounds  No wheezing, rhonchi or rales  Abdominal:      General: Abdomen is flat  A surgical scar is present  Palpations: Abdomen is soft  Tenderness: There is no abdominal tenderness  There is no guarding or rebound  Skin:         Neurological:      General: No focal deficit present  Mental Status: She is alert  Psychiatric:         Mood and Affect: Mood and affect normal          Speech: Speech normal          Behavior: Behavior normal  Behavior is cooperative               Results:  Lab Results   Component Value Date    SODIUM 138 04/30/2022    K 4 5 04/30/2022     04/30/2022    CO2 26 04/30/2022    BUN 25 04/30/2022    CREATININE 1 25 04/30/2022    CALCIUM 9 7 04/30/2022       No results found for: HGBA1C    Lab Results   Component Value Date    WBC 6 27 04/30/2022    HGB 12 0 04/30/2022    HCT 38 2 04/30/2022    MCV 90 04/30/2022     04/30/2022 CAD (coronary artery disease)

## 2023-07-17 ENCOUNTER — CONSULT (OUTPATIENT)
Dept: GASTROENTEROLOGY | Facility: CLINIC | Age: 78
End: 2023-07-17
Payer: MEDICARE

## 2023-07-17 VITALS
DIASTOLIC BLOOD PRESSURE: 60 MMHG | HEIGHT: 64 IN | SYSTOLIC BLOOD PRESSURE: 148 MMHG | HEART RATE: 56 BPM | BODY MASS INDEX: 24.92 KG/M2 | RESPIRATION RATE: 18 BRPM | OXYGEN SATURATION: 98 % | WEIGHT: 146 LBS

## 2023-07-17 DIAGNOSIS — E53.8 DEFICIENCY OF OTHER SPECIFIED B GROUP VITAMINS: ICD-10-CM

## 2023-07-17 DIAGNOSIS — R15.9 INCONTINENCE OF FECES, UNSPECIFIED FECAL INCONTINENCE TYPE: ICD-10-CM

## 2023-07-17 DIAGNOSIS — D64.9 ANEMIA, UNSPECIFIED TYPE: ICD-10-CM

## 2023-07-17 DIAGNOSIS — R19.4 CHANGE IN BOWEL HABITS: Primary | ICD-10-CM

## 2023-07-17 PROCEDURE — 99204 OFFICE O/P NEW MOD 45 MIN: CPT | Performed by: INTERNAL MEDICINE

## 2023-07-17 NOTE — ASSESSMENT & PLAN NOTE
Anticoagulation Clinic Progress Note    Anticoagulation Summary  As of 8/16/2022    INR goal:  2.0-3.0   TTR:  80.8 % (1.8 y)   INR used for dosing:  3.2 (8/16/2022)   Warfarin maintenance plan:  2 mg every Tue, Fri; 4 mg all other days   Weekly warfarin total:  24 mg   Plan last modified:  Ken Craven, PharmD (5/9/2022)   Next INR check:  9/13/2022   Target end date:      Indications    H/O mechanical aortic valve replacement [Z95.2]             Anticoagulation Episode Summary     INR check location:      Preferred lab:      Send INR reminders to:   [facility] [place]    Comments:        Anticoagulation Care Providers     Provider Role Specialty Phone number    Errol Mantilla MD Referring Cardiology [phone]          Clinic Interview:  Patient Findings     Negatives:  Signs/symptoms of thrombosis, Signs/symptoms of bleeding,   Laboratory test error suspected, Change in health, Change in alcohol use,   Change in activity, Upcoming invasive procedure, Emergency department   visit, Upcoming dental procedure, Missed doses, Extra doses, Change in   medications, Change in diet/appetite, Hospital admission, Bruising, Other   complaints    Comments:  Reports inconsistent salad intake but then reports this has   been inconsistent for 6 months. Attempted counseling on vitamin K intake   and INR fluctuations. He reported he was going to switch to a MVI with   vitamin K in it since INR was high. Advised against since he has been   therapeutic since 1/27/22. He also wanted to decrease his dose but also   advised against due to reasoning above.      Clinical Outcomes     Negatives:  Major bleeding event, Thromboembolic event,   Anticoagulation-related hospital admission, Anticoagulation-related ED   visit, Anticoagulation-related fatality    Comments:  Reports inconsistent salad intake but then reports this has   been inconsistent for 6 months. Attempted counseling on vitamin K intake   and INR fluctuations.  Patient reports a change in bowel habits ever since her stroke back in 2021. She is now having a bowel movement every 3 to 4 days. She is not uncomfortable with his bowel pattern. She does not pass a hard stool nor does she strain to have a bowel movement. There is been no diarrhea. At times her stool may be loose. In addition patient was referred to our office by gynecology for colonoscopy. Given her change in bowel habits, would recommend considering colonoscopy. Patient is interested in pursuing colonoscopy. She will receive a Colyte preparation and split dose with the second dose completed 3 hours prior to arrival.  2 bisacodyl tablets. I explained to the patient the procedure of colonoscopy as well as its potential risks which are approximately 3 in 1000 chance of bleeding, infection, and perforation. Perforation may require a surgeon to repair it. I also explained the small but significant chance of missing lesions with colonoscopy which has been reported to be about 5-10%. He reported he was going to switch to a MVI with   vitamin K in it since INR was high. Advised against since he has been   therapeutic since 1/27/22. He also wanted to decrease his dose but also   advised against due to reasoning above.        INR History:  Anticoagulation Monitoring 6/24/2022 7/19/2022 8/16/2022   INR 2.3 2.9 3.2   INR Date 6/24/2022 7/19/2022 8/16/2022   INR Goal 2.0-3.0 2.0-3.0 2.0-3.0   Trend Same Same Same   Last Week Total 24 mg 24 mg 24 mg   Next Week Total 24 mg 24 mg 22 mg   Sun 4 mg 4 mg 4 mg   Mon 4 mg 4 mg 4 mg   Tue 2 mg 2 mg 2 mg   Wed 4 mg 4 mg 2 mg (8/17); Otherwise 4 mg   Thu 4 mg 4 mg 4 mg   Fri 2 mg 2 mg 2 mg   Sat 4 mg 4 mg 4 mg   Visit Report - - -   Some recent data might be hidden       Plan:  1. INR is Supratherapeutic today- see above in Anticoagulation Summary.  Will instruct Jackson Alba to Change their warfarin regimen- see above in Anticoagulation Summary.  2. Follow up in 1 month (patient refused recommended 2 week appointment interval)   3. Patient declines warfarin refills.  4. Verbal and written information provided. Patient expresses understanding and has no further questions at this time.    Riana Mann [facility]

## 2023-07-17 NOTE — PROGRESS NOTES
[facility] [place] [facility]  Gastroenterology Outpatient Consultation  Patient Tim Sanchez   Age 65 y.o. Gender female   MRN: [id number]  [facility] [id number]     ASSESSMENT AND PLAN:   Problem List Items Addressed This Visit        Other    Incontinence of feces     Patient has noticed fecal incontinence since a COVID infection back in February 2023. She may have incontinence of a small amount of loose stool. Many times she does not feel this. She notices small amount of liquid stool in her underpants. The exact cause for this is not clear but may be related to diminished sphincter tone or may be loose bowel movements. May also be related to underlying perineal problem as she does have urinary continence as well. For now I would like her to start a fiber supplement such as Benefiber 2 teaspoonfuls or Citrucel 1 heaping tablespoonful mixed in 6 to 8 ounce of noncarbonated liquid daily. This will be assessed further at time of colonoscopy. Relevant Orders    Colonoscopy    Change in bowel habits - Primary     Patient reports a change in bowel habits ever since her stroke back in 2021. She is now having a bowel movement every 3 to 4 days. She is not uncomfortable with his bowel pattern. She does not pass a hard stool nor does she strain to have a bowel movement. There is been no diarrhea. At times her stool may be loose. In addition patient was referred to our office by gynecology for colonoscopy. Given her change in bowel habits, would recommend considering colonoscopy. Patient is interested in pursuing colonoscopy. She will receive a Colyte preparation and split dose with the second dose completed 3 hours prior to arrival.  2 bisacodyl tablets. I explained to the patient the procedure of colonoscopy as well as its potential risks which are approximately 3 in 1000 chance of bleeding, infection, and perforation. Perforation may require a surgeon to repair it.   I also explained the small but significant chance of missing lesions with colonoscopy which has been reported to be about 5-10%. Relevant Medications    polyethylene glycol (COLYTE) 4000 mL solution    Other Relevant Orders    Colonoscopy    Anemia     Patient was noted to have a mild anemia with hemoglobin of 11.3 normal hematocrit normal MCV. Her iron saturation was 18% with a ferritin of 23. Her anemia may be multifactorial related to underlying chronic disease including chronic kidney disease. For completeness would recommend checking celiac antibody panel. Check vitamin B12 and folate. Relevant Orders    Vitamin B12    Folate    Celiac Disease Antibody Profile   Other Visit Diagnoses     Deficiency of other specified B group vitamins        Relevant Orders    Vitamin B12    Folate         _____________________________________________________________    HPI:   Ines Troy is a delightful 18-year-old woman home seen in the office in consultation for a change in bowel habits and some fecal incontinence. She reports that she is having about 1 bowel movement every 3 to 4 days. She noticed this change around 2021 following her stroke. She does not feel uncomfortable with this bowel pattern. Previously she thinks she used to have a bowel movement every day. She does not feel like she is passing a hard stool, she denies any abdominal pain associated with this. She does not strain to have a bowel movement. She does report having some fecal incontinence maybe 1 time per week occasionally twice a week. She noticed this started in February following a infection with COVID. She will notice some soiling in her underpants. Most the time she is surprised to see staining in her underpants. Sometimes she will feel it but most times she is surprised to see it. There is been no rectal bleeding or black stools. She does report occasional loose bowel movements. She does not drink dairy products. She does not really drink soda. She does not use artificial sweeteners and does not drink diet beverages    From an upper GI standpoint she denies any heartburn indigestion dysphagia nausea vomiting or unintentional weight loss. There is been no early satiety. She does not use NSAIDs. She is on 81 mg aspirin daily. She has never had a colonoscopy. There is no family history colon cancer colon polyps. She does not smoke tobacco and does not drink alcohol. 5/11/2023 laboratory data-outlined below  Iron was 57  Ferritin 23  Iron saturation 16%   vitamin D 46  WC 5.79 Hgb 11.3 HCT 35 MCV 90 platelet count [phone]    No Known Allergies  Current Outpatient Medications   Medication Sig Dispense Refill   • amLODIPine (NORVASC) 10 mg tablet Take 1 tablet (10 mg total) by mouth daily 90 tablet 3   • aspirin (ECOTRIN LOW STRENGTH) 81 mg EC tablet Take 1 tablet (81 mg total) by mouth daily 90 tablet 1   • atorvastatin (LIPITOR) 40 mg tablet Take 1 tablet (40 mg total) by mouth every evening 90 tablet 3   • estradiol (ESTRACE VAGINAL) 0.1 mg/g vaginal cream Insert 1 g into the vagina once a week 42.5 g 1   • levothyroxine 88 mcg tablet Take 1 tablet (88 mcg total) by mouth daily in the early morning 90 tablet 3   • metoprolol succinate (Toprol XL) 25 mg 24 hr tablet Take 1 tablet (25 mg total) by mouth daily 90 tablet 3   • polyethylene glycol (COLYTE) 4000 mL solution Take 4,000 mL by mouth once for 1 dose Take 4000 mL by mouth once for 1 dose. Use as directed 4000 mL 0     No current facility-administered medications for this visit.      MEDICAL HISTORY:  Past Medical History:   Diagnosis Date   • Hypertension    • TIA (transient ischemic attack)     2021     Past Surgical History:   Procedure Laterality Date   • APPENDECTOMY     • CHOLECYSTECTOMY       Social History     Substance and Sexual Activity   Alcohol Use Never     Social History     Substance and Sexual Activity   Drug Use Never     Social History     Tobacco Use   Smoking Status Former   • Packs/day: 0.50   • Years: 40.00   • Total pack years: 20.00   • Types: Cigarettes   • Quit date:    • Years since quittin.5   Smokeless Tobacco Never   Tobacco Comments    Social      Family History   Problem Relation Age of Onset   • Stroke Mother    • Diabetes Father    • Diabetes Sister    • Cancer Sister         brain cancer   • No Known Problems Maternal Grandmother    • No Known Problems Paternal Grandmother    • No Known Problems Paternal Aunt    • No Known Problems Paternal Aunt        REVIEW OF SYSTEMS:  CONSTITUTIONAL: Denies any fever, chills, rigors, and weight loss. HEENT: No earache or tinnitus. Denies hearing loss or visual disturbances. CARDIOVASCULAR: No chest pain or palpitations. RESPIRATORY: Denies any cough, hemoptysis, shortness of breath or dyspnea on exertion. GASTROINTESTINAL: As noted in the History of Present Illness. GENITOURINARY: No problems with urination. Denies any hematuria or dysuria. .  She does report a lot of urinary incontinence as well. NEUROLOGIC: No dizziness or vertigo, denies headaches. MUSCULOSKELETAL: Denies any muscle or joint pain. SKIN: Denies skin rashes or itching. ENDOCRINE: Denies excessive thirst. Denies intolerance to heat or cold. PSYCHOSOCIAL: Denies depression or anxiety. Denies any recent memory loss. Objective   Blood pressure 148/60, pulse 56, resp. rate 18, height 5' 4" (1.626 m), weight 66.2 kg (146 lb), SpO2 98 %. Body mass index is 25.06 kg/m². PHYSICAL EXAM:   General Appearance: Alert, cooperative, no distress  HEENT: Normocephalic, atraumatic, anicteric. Neck: Supple, symmetrical, trachea midline  Lungs: Clear to auscultation bilaterally; no rales, rhonchi or wheezing; respirations unlabored   Heart: Regular rate and rhythm; no murmur, rub, or gallop. Abdomen: Soft, bowel sounds normal, non-tender, non-distended; no masses, there is no hepatosplenomegaly.  No spider angiomas  Genitalia: Deferred Rectal: Deferred   Extremities: No cyanosis, clubbing or edema   Skin: No jaundice, rashes, or lesions   Lymph nodes: No palpable cervical lymphadenopathy   Lab Results:   No visits with results within 2 Month(s) from this visit.    Latest known visit with results is:   Appointment on 05/11/2023   Component Date Value   • Vit D, 25-Hydroxy 05/11/2023 46.0    • PTH 05/11/2023 72.1    • Sodium 05/11/2023 134 (L)    • Potassium 05/11/2023 4.3    • Chloride 05/11/2023 107    • CO2 05/11/2023 26    • ANION GAP 05/11/2023 1 (L)    • BUN 05/11/2023 23    • Creatinine 05/11/2023 1.28    • Glucose, Fasting 05/11/2023 113 (H)    • Calcium 05/11/2023 9.2    • AST 05/11/2023 13    • ALT 05/11/2023 16    • Alkaline Phosphatase 05/11/2023 115    • Total Protein 05/11/2023 8.2    • Albumin 05/11/2023 3.7    • Total Bilirubin 05/11/2023 0.55    • eGFR 05/11/2023 40    • TSH 3RD GENERATON 05/11/2023 3.360    • WBC 05/11/2023 5.79    • RBC 05/11/2023 3.87    • Hemoglobin 05/11/2023 11.3 (L)    • Hematocrit 05/11/2023 35.0    • MCV 05/11/2023 90    • MCH 05/11/2023 29.2    • MCHC 05/11/2023 32.3    • RDW 05/11/2023 14.8    • MPV 05/11/2023 9.7    • Platelets 81/63/9910 198    • nRBC 05/11/2023 0    • Neutrophils Relative 05/11/2023 63    • Immat GRANS % 05/11/2023 1    • Lymphocytes Relative 05/11/2023 23    • Monocytes Relative 05/11/2023 8    • Eosinophils Relative 05/11/2023 4    • Basophils Relative 05/11/2023 1    • Neutrophils Absolute 05/11/2023 3.71    • Immature Grans Absolute 05/11/2023 0.03    • Lymphocytes Absolute 05/11/2023 1.34    • Monocytes Absolute 05/11/2023 0.47    • Eosinophils Absolute 05/11/2023 0.21    • Basophils Absolute 05/11/2023 0.03    • Iron Saturation 05/11/2023 18    • TIBC 05/11/2023 318    • Iron 05/11/2023 57    • Ferritin 05/11/2023 23      Radiology Results:     Vipul Lipscomb DO   07/17/23   Cc:

## 2023-07-17 NOTE — LETTER
July 17, 2023     Luis Hernandez, [address] [facility]    Patient: Geoff Panchal   YOB: 1945   Date of Visit: 7/17/2023       Dear Dr. Allie Interiano:    Thank you for referring Josefina Mae to me for evaluation. Below are my notes for this consultation. If you have questions, please do not hesitate to call me. I look forward to following your patient along with you. Sincerely,        Mara Patel DO        CC: No Recipients    Mara Patel DO  7/17/2023  4:17 PM  Incomplete  [facility]astroenterology  Gastroenterology Outpatient Consultation  Patient Geoff Panchal   Age 65 y.o. Gender female   MRN: [id number]  [facility] [id number]     ASSESSMENT AND PLAN:   Problem List Items Addressed This Visit          Other    Incontinence of feces     Patient has noticed fecal incontinence since a COVID infection back in February 2023. She may have incontinence of a small amount of loose stool. Many times she does not feel this. She notices small amount of liquid stool in her underpants. The exact cause for this is not clear but may be related to diminished sphincter tone or may be loose bowel movements. May also be related to underlying perineal problem as she does have urinary continence as well. For now I would like her to start a fiber supplement such as Benefiber 2 teaspoonfuls or Citrucel 1 heaping tablespoonful mixed in 6 to 8 ounce of noncarbonated liquid daily. This will be assessed further at time of colonoscopy. Relevant Orders    Colonoscopy    Change in bowel habits - Primary     Patient reports a change in bowel habits ever since her stroke back in 2021. She is now having a bowel movement every 3 to 4 days. She is not uncomfortable with his bowel pattern. She does not pass a hard stool nor does she strain to have a bowel movement. There is been no diarrhea. At times her stool may be loose.   In addition patient was referred to our office by gynecology for colonoscopy. Given her change in bowel habits, would recommend considering colonoscopy. Patient is interested in pursuing colonoscopy. She will receive a Colyte preparation and split dose with the second dose completed 3 hours prior to arrival.  2 bisacodyl tablets. I explained to the patient the procedure of colonoscopy as well as its potential risks which are approximately 3 in 1000 chance of bleeding, infection, and perforation. Perforation may require a surgeon to repair it. I also explained the small but significant chance of missing lesions with colonoscopy which has been reported to be about 5-10%. Relevant Medications    polyethylene glycol (COLYTE) 4000 mL solution    Other Relevant Orders    Colonoscopy    Anemia     Patient was noted to have a mild anemia with hemoglobin of 11.3 normal hematocrit normal MCV. Her iron saturation was 18% with a ferritin of 23. Her anemia may be multifactorial related to underlying chronic disease including chronic kidney disease. For completeness would recommend checking celiac antibody panel. Check vitamin B12 and folate. Relevant Orders    Vitamin B12    Folate    Celiac Disease Antibody Profile     Other Visit Diagnoses       Deficiency of other specified B group vitamins        Relevant Orders    Vitamin B12    Folate           _____________________________________________________________    HPI:   Fer Joyner is a delightful 65-year-old woman home seen in the office in consultation for a change in bowel habits and some fecal incontinence. She reports that she is having about 1 bowel movement every 3 to 4 days. She noticed this change around 2021 following her stroke. She does not feel uncomfortable with this bowel pattern. Previously she thinks she used to have a bowel movement every day. She does not feel like she is passing a hard stool, she denies any abdominal pain associated with this.   She does not strain to have a bowel movement. She does report having some fecal incontinence maybe 1 time per week occasionally twice a week. She noticed this started in February following a infection with COVID. She will notice some soiling in her underpants. Most the time she is surprised to see staining in her underpants. Sometimes she will feel it but most times she is surprised to see it. There is been no rectal bleeding or black stools. She does report occasional loose bowel movements. She does not drink dairy products. She does not really drink soda. She does not use artificial sweeteners and does not drink diet beverages    From an upper GI standpoint she denies any heartburn indigestion dysphagia nausea vomiting or unintentional weight loss. There is been no early satiety. She does not use NSAIDs. She is on 81 mg aspirin daily. She has never had a colonoscopy. There is no family history colon cancer colon polyps. She does not smoke tobacco and does not drink alcohol.     5/11/2023 laboratory data-outlined below  Iron was 57  Ferritin 23  Iron saturation 16%   vitamin D 46  WC 5.79 Hgb 11.3 HCT 35 MCV 90 platelet count [phone]    No Known Allergies  Current Outpatient Medications   Medication Sig Dispense Refill   • amLODIPine (NORVASC) 10 mg tablet Take 1 tablet (10 mg total) by mouth daily 90 tablet 3   • aspirin (ECOTRIN LOW STRENGTH) 81 mg EC tablet Take 1 tablet (81 mg total) by mouth daily 90 tablet 1   • atorvastatin (LIPITOR) 40 mg tablet Take 1 tablet (40 mg total) by mouth every evening 90 tablet 3   • estradiol (ESTRACE VAGINAL) 0.1 mg/g vaginal cream Insert 1 g into the vagina once a week 42.5 g 1   • levothyroxine 88 mcg tablet Take 1 tablet (88 mcg total) by mouth daily in the early morning 90 tablet 3   • metoprolol succinate (Toprol XL) 25 mg 24 hr tablet Take 1 tablet (25 mg total) by mouth daily 90 tablet 3   • polyethylene glycol (COLYTE) 4000 mL solution Take 4,000 mL by mouth once for 1 dose Take 4000 mL by mouth once for 1 dose. Use as directed 4000 mL 0     No current facility-administered medications for this visit. MEDICAL HISTORY:  Past Medical History:   Diagnosis Date   • Hypertension    • TIA (transient ischemic attack)          Past Surgical History:   Procedure Laterality Date   • APPENDECTOMY     • CHOLECYSTECTOMY       Social History     Substance and Sexual Activity   Alcohol Use Never     Social History     Substance and Sexual Activity   Drug Use Never     Social History     Tobacco Use   Smoking Status Former   • Packs/day: 0.50   • Years: 40.00   • Total pack years: 20.00   • Types: Cigarettes   • Quit date:    • Years since quittin.5   Smokeless Tobacco Never   Tobacco Comments    Social      Family History   Problem Relation Age of Onset   • Stroke Mother    • Diabetes Father    • Diabetes Sister    • Cancer Sister         brain cancer   • No Known Problems Maternal Grandmother    • No Known Problems Paternal Grandmother    • No Known Problems Paternal Aunt    • No Known Problems Paternal Aunt        REVIEW OF SYSTEMS:  CONSTITUTIONAL: Denies any fever, chills, rigors, and weight loss. HEENT: No earache or tinnitus. Denies hearing loss or visual disturbances. CARDIOVASCULAR: No chest pain or palpitations. RESPIRATORY: Denies any cough, hemoptysis, shortness of breath or dyspnea on exertion. GASTROINTESTINAL: As noted in the History of Present Illness. GENITOURINARY: No problems with urination. Denies any hematuria or dysuria. .  She does report a lot of urinary incontinence as well. NEUROLOGIC: No dizziness or vertigo, denies headaches. MUSCULOSKELETAL: Denies any muscle or joint pain. SKIN: Denies skin rashes or itching. ENDOCRINE: Denies excessive thirst. Denies intolerance to heat or cold. PSYCHOSOCIAL: Denies depression or anxiety. Denies any recent memory loss. Objective   Blood pressure 148/60, pulse 56, resp.  rate 18, height 5' 4" (1.626 m), weight 66.2 kg (146 lb), SpO2 98 %. Body mass index is 25.06 kg/m². PHYSICAL EXAM:   General Appearance: Alert, cooperative, no distress  HEENT: Normocephalic, atraumatic, anicteric. Neck: Supple, symmetrical, trachea midline  Lungs: Clear to auscultation bilaterally; no rales, rhonchi or wheezing; respirations unlabored   Heart: Regular rate and rhythm; no murmur, rub, or gallop. Abdomen: Soft, bowel sounds normal, non-tender, non-distended; no masses, there is no hepatosplenomegaly. No spider angiomas  Genitalia: Deferred   Rectal: Deferred   Extremities: No cyanosis, clubbing or edema   Skin: No jaundice, rashes, or lesions   Lymph nodes: No palpable cervical lymphadenopathy   Lab Results:   No visits with results within 2 Month(s) from this visit.    Latest known visit with results is:   Appointment on 05/11/2023   Component Date Value   • Vit D, 25-Hydroxy 05/11/2023 46.0    • PTH 05/11/2023 72.1    • Sodium 05/11/2023 134 (L)    • Potassium 05/11/2023 4.3    • Chloride 05/11/2023 107    • CO2 05/11/2023 26    • ANION GAP 05/11/2023 1 (L)    • BUN 05/11/2023 23    • Creatinine 05/11/2023 1.28    • Glucose, Fasting 05/11/2023 113 (H)    • Calcium 05/11/2023 9.2    • AST 05/11/2023 13    • ALT 05/11/2023 16    • Alkaline Phosphatase 05/11/2023 115    • Total Protein 05/11/2023 8.2    • Albumin 05/11/2023 3.7    • Total Bilirubin 05/11/2023 0.55    • eGFR 05/11/2023 40    • TSH 3RD GENERATON 05/11/2023 3.360    • WBC 05/11/2023 5.79    • RBC 05/11/2023 3.87    • Hemoglobin 05/11/2023 11.3 (L)    • Hematocrit 05/11/2023 35.0    • MCV 05/11/2023 90    • MCH 05/11/2023 29.2    • MCHC 05/11/2023 32.3    • RDW 05/11/2023 14.8    • MPV 05/11/2023 9.7    • Platelets 82/03/0788 198    • nRBC 05/11/2023 0    • Neutrophils Relative 05/11/2023 63    • Immat GRANS % 05/11/2023 1    • Lymphocytes Relative 05/11/2023 23    • Monocytes Relative 05/11/2023 8    • Eosinophils Relative 05/11/2023 4    • Basophils Relative 05/11/2023 1    • Neutrophils Absolute 05/11/2023 3.71    • Immature Grans Absolute 05/11/2023 0.03    • Lymphocytes Absolute 05/11/2023 1.34    • Monocytes Absolute 05/11/2023 0.47    • Eosinophils Absolute 05/11/2023 0.21    • Basophils Absolute 05/11/2023 0.03    • Iron Saturation 05/11/2023 18    • TIBC 05/11/2023 318    • Iron 05/11/2023 57    • Ferritin 05/11/2023 23      Radiology Results:     Radha Pack DO   07/17/23   Cc:

## 2023-07-17 NOTE — ASSESSMENT & PLAN NOTE
Patient was noted to have a mild anemia with hemoglobin of 11.3 normal hematocrit normal MCV. Her iron saturation was 18% with a ferritin of 23. Her anemia may be multifactorial related to underlying chronic disease including chronic kidney disease. For completeness would recommend checking celiac antibody panel. Check vitamin B12 and folate.

## 2023-07-17 NOTE — ASSESSMENT & PLAN NOTE
Patient has noticed fecal incontinence since a COVID infection back in February 2023. She may have incontinence of a small amount of loose stool. Many times she does not feel this. She notices small amount of liquid stool in her underpants. The exact cause for this is not clear but may be related to diminished sphincter tone or may be loose bowel movements. May also be related to underlying perineal problem as she does have urinary continence as well. For now I would like her to start a fiber supplement such as Benefiber 2 teaspoonfuls or Citrucel 1 heaping tablespoonful mixed in 6 to 8 ounce of noncarbonated liquid daily. This will be assessed further at time of colonoscopy.

## 2023-07-17 NOTE — PATIENT INSTRUCTIONS
Change in bowel habits  Patient reports a change in bowel habits ever since her stroke back in 2021. She is now having a bowel movement every 3 to 4 days. She is not uncomfortable with his bowel pattern. She does not pass a hard stool nor does she strain to have a bowel movement. There is been no diarrhea. At times her stool may be loose. In addition patient was referred to our office by gynecology for colonoscopy. Given her change in bowel habits, would recommend considering colonoscopy. Patient is interested in pursuing colonoscopy. She will receive a Colyte preparation and split dose with the second dose completed 3 hours prior to arrival.  2 bisacodyl tablets. I explained to the patient the procedure of colonoscopy as well as its potential risks which are approximately 3 in 1000 chance of bleeding, infection, and perforation. Perforation may require a surgeon to repair it. I also explained the small but significant chance of missing lesions with colonoscopy which has been reported to be about 5-10%. Incontinence of feces  Patient has noticed fecal incontinence since a COVID infection back in February 2023. She may have incontinence of a small amount of loose stool. Many times she does not feel this. She notices small amount of liquid stool in her underpants. The exact cause for this is not clear but may be related to diminished sphincter tone or may be loose bowel movements. May also be related to underlying perineal problem as she does have urinary continence as well. For now I would like her to start a fiber supplement such as Benefiber 2 teaspoonfuls or Citrucel 1 heaping tablespoonful mixed in 6 to 8 ounce of noncarbonated liquid daily. This will be assessed further at time of colonoscopy. Anemia  Patient was noted to have a mild anemia with hemoglobin of 11.3 normal hematocrit normal MCV. Her iron saturation was 18% with a ferritin of 23.   Her anemia may be multifactorial related to underlying chronic disease including chronic kidney disease. For completeness would recommend checking celiac antibody panel. Check vitamin B12 and folate.

## 2023-08-16 ENCOUNTER — TELEPHONE (OUTPATIENT)
Age: 78
End: 2023-08-16

## 2023-08-16 NOTE — TELEPHONE ENCOUNTER
Scheduled date of colonoscopy (as of today): 10/10/23  Physician performing colonoscopy: Dr. Maite Mendez    Location of colonoscopy: [facility]

## 2023-09-25 ENCOUNTER — PROCEDURE VISIT (OUTPATIENT)
Dept: OBGYN CLINIC | Facility: CLINIC | Age: 78
End: 2023-09-25
Payer: MEDICARE

## 2023-09-25 VITALS
DIASTOLIC BLOOD PRESSURE: 72 MMHG | BODY MASS INDEX: 25.16 KG/M2 | SYSTOLIC BLOOD PRESSURE: 124 MMHG | HEIGHT: 64 IN | WEIGHT: 147.4 LBS

## 2023-09-25 DIAGNOSIS — N81.3 PROCIDENTIA OF UTERUS: Primary | ICD-10-CM

## 2023-09-25 PROCEDURE — 99213 OFFICE O/P EST LOW 20 MIN: CPT | Performed by: ADVANCED PRACTICE MIDWIFE

## 2023-09-25 NOTE — PROGRESS NOTES
Pessary    Date/Time: 9/25/2023 10:30 AM    Performed by: Consuelo Crawley CNM  Authorized by: Consuelo Crawley CNM  [place] Protocol:  Consent: Verbal consent obtained. Risks and benefits: risks, benefits and alternatives were discussed  Consent given by: patient  Time out: Immediately prior to procedure a "time out" was called to verify the correct patient, procedure, equipment, support staff and site/side marked as required. Patient understanding: patient states understanding of the procedure being performed  Patient consent: the patient's understanding of the procedure matches consent given  Procedure consent: procedure consent matches procedure scheduled  Relevant documents: relevant documents present and verified  Required items: required blood products, implants, devices, and special equipment available  Patient identity confirmed: verbally with patient      Indication:     Indication for pessary: uterine prolapse    Pre-procedure:     Pessary procedure type:  Cleaning/check  Problems:     Pessary complications: none    Procedure:     Pessary type:  Gellhorn flexible    Pessary size:  5    Patient tolerance of procedure: Tolerated well, no immediate complications  Comments:     Procedure comments:  No complaints of bleeding, urinary incontinence, pelvic pressure. Follows up with GI for incontinence of stool. Pessary removed without difficulty. Spec exam: no lesions, discharge or bleeding. Pessary cleaned and reinserted without difficulty.

## 2023-10-04 ENCOUNTER — TELEPHONE (OUTPATIENT)
Age: 78
End: 2023-10-04

## 2023-10-10 ENCOUNTER — ANESTHESIA EVENT (OUTPATIENT)
Dept: GASTROENTEROLOGY | Facility: HOSPITAL | Age: 78
End: 2023-10-10

## 2023-10-10 ENCOUNTER — ANESTHESIA (OUTPATIENT)
Dept: GASTROENTEROLOGY | Facility: HOSPITAL | Age: 78
End: 2023-10-10

## 2023-10-10 ENCOUNTER — HOSPITAL ENCOUNTER (OUTPATIENT)
Dept: GASTROENTEROLOGY | Facility: HOSPITAL | Age: 78
Setting detail: OUTPATIENT SURGERY
Discharge: HOME | End: 2023-10-10
Attending: INTERNAL MEDICINE
Payer: MEDICARE

## 2023-10-10 VITALS
SYSTOLIC BLOOD PRESSURE: 126 MMHG | RESPIRATION RATE: 18 BRPM | WEIGHT: 147 LBS | TEMPERATURE: 97.2 F | BODY MASS INDEX: 25.1 KG/M2 | DIASTOLIC BLOOD PRESSURE: 58 MMHG | HEART RATE: 79 BPM | OXYGEN SATURATION: 97 % | HEIGHT: 64 IN

## 2023-10-10 DIAGNOSIS — N81.3 PROCIDENTIA OF UTERUS: Primary | ICD-10-CM

## 2023-10-10 DIAGNOSIS — R19.00 PELVIC MASS: ICD-10-CM

## 2023-10-10 DIAGNOSIS — R19.4 CHANGE IN BOWEL HABITS: ICD-10-CM

## 2023-10-10 DIAGNOSIS — R15.9 INCONTINENCE OF FECES, UNSPECIFIED FECAL INCONTINENCE TYPE: ICD-10-CM

## 2023-10-10 PROCEDURE — 88305 TISSUE EXAM BY PATHOLOGIST: CPT | Performed by: PATHOLOGY

## 2023-10-10 RX ORDER — PROPOFOL 10 MG/ML
INJECTION, EMULSION INTRAVENOUS AS NEEDED
Status: DISCONTINUED | OUTPATIENT
Start: 2023-10-10 | End: 2023-10-10

## 2023-10-10 RX ORDER — SODIUM CHLORIDE, SODIUM LACTATE, POTASSIUM CHLORIDE, CALCIUM CHLORIDE 600; 310; 30; 20 MG/100ML; MG/100ML; MG/100ML; MG/100ML
125 INJECTION, SOLUTION INTRAVENOUS CONTINUOUS
Status: DISCONTINUED | OUTPATIENT
Start: 2023-10-10 | End: 2023-10-14 | Stop reason: HOSPADM

## 2023-10-10 RX ADMIN — PROPOFOL 50 MG: 10 INJECTION, EMULSION INTRAVENOUS at 11:28

## 2023-10-10 RX ADMIN — PROPOFOL 50 MG: 10 INJECTION, EMULSION INTRAVENOUS at 11:15

## 2023-10-10 RX ADMIN — PROPOFOL 100 MG: 10 INJECTION, EMULSION INTRAVENOUS at 11:00

## 2023-10-10 RX ADMIN — PROPOFOL 50 MG: 10 INJECTION, EMULSION INTRAVENOUS at 11:04

## 2023-10-10 RX ADMIN — PROPOFOL 50 MG: 10 INJECTION, EMULSION INTRAVENOUS at 11:07

## 2023-10-10 RX ADMIN — PROPOFOL 50 MG: 10 INJECTION, EMULSION INTRAVENOUS at 11:12

## 2023-10-10 RX ADMIN — SODIUM CHLORIDE, SODIUM LACTATE, POTASSIUM CHLORIDE, AND CALCIUM CHLORIDE: .6; .31; .03; .02 INJECTION, SOLUTION INTRAVENOUS at 08:49

## 2023-10-10 NOTE — ANESTHESIA POSTPROCEDURE EVALUATION
Post-Op Assessment Note    CV Status:  Stable  Pain Score: 0    Pain management: adequate     Mental Status:  Alert   Hydration Status:  Stable   PONV Controlled:  Controlled   Airway Patency:  Patent      Post Op Vitals Reviewed: Yes      Staff: CRNA         No notable events documented.     BP   108/57   Temp      Pulse  69   Resp   20   SpO2   98

## 2023-10-10 NOTE — ANESTHESIA PREPROCEDURE EVALUATION
Procedure:  COLONOSCOPY    Relevant Problems   CARDIO   (+) Primary hypertension      ENDO   (+) Other specified hypothyroidism      /RENAL   (+) Stage 3a chronic kidney disease (HCC)      HEMATOLOGY   (+) Anemia      NEURO/PSYCH   (+) Cerebrovascular accident (CVA) ([address])        Physical Exam    Airway    Mallampati score: II  TM Distance: >3 FB  Neck ROM: full     Dental       Cardiovascular  Cardiovascular exam normal    Pulmonary  Pulmonary exam normal     Other Findings        Anesthesia Plan  ASA Score- 2     Anesthesia Type- IV sedation with anesthesia with ASA Monitors. Additional Monitors:   Airway Plan:           Plan Factors-    Chart reviewed. EKG reviewed. Existing labs reviewed. Patient summary reviewed. Induction- intravenous. Postoperative Plan-     Informed Consent- Anesthetic plan and risks discussed with patient. I personally reviewed this patient with the CRNA. Discussed and agreed on the Anesthesia Plan with the CRNA. Kenneth Em

## 2023-10-10 NOTE — H&P
History and Physical - [facility]  Chalo Quach 77 y.o. female MRN: [id number]                  HPI: Chalo Quach is a 77y.o. year old female who presents for colonoscopy. Patient was seen by me on 2023. She was experiencing fecal incontinence. She also had a change in her bowel habits since her stroke in  and she has a mild anemia. She has not had a prior colonoscopy. Despite fiber, patient still reports having 1 bowel every 3 days. She also reports fecal incontinence every few days as well. She may pass only a small amount or a moderate amount. She also has urinary incontinence. Denies any rectal bleeding or melena. Please refer to office note dated 2023 for details of her medical history. She denies any family history colon cancer colon polyps. REVIEW OF SYSTEMS: Per the HPI, and otherwise unremarkable.     Historical Information   Past Medical History:   Diagnosis Date   • Hypertension    • TIA (transient ischemic attack)          Past Surgical History:   Procedure Laterality Date   • APPENDECTOMY     • CHOLECYSTECTOMY       Social History   Social History     Substance and Sexual Activity   Alcohol Use Never     Social History     Substance and Sexual Activity   Drug Use Never     Social History     Tobacco Use   Smoking Status Former   • Packs/day: 0.50   • Years: 40.00   • Total pack years: 20.00   • Types: Cigarettes   • Quit date:    • Years since quittin.7   Smokeless Tobacco Never   Tobacco Comments    Social      Family History   Problem Relation Age of Onset   • Stroke Mother    • Diabetes Father    • Diabetes Sister    • Cancer Sister         brain cancer   • No Known Problems Maternal Grandmother    • No Known Problems Paternal Grandmother    • No Known Problems Paternal Aunt    • No Known Problems Paternal Aunt        Meds/Allergies       Current Outpatient Medications:   •  amLODIPine (NORVASC) 10 mg tablet  •  aspirin (ECOTRIN LOW STRENGTH) 81 mg EC tablet  •  atorvastatin (LIPITOR) 40 mg tablet  •  levothyroxine 88 mcg tablet  •  metoprolol succinate (Toprol XL) 25 mg 24 hr tablet  •  estradiol (ESTRACE VAGINAL) 0.1 mg/g vaginal cream  •  polyethylene glycol (COLYTE) 4000 mL solution    Current Facility-Administered Medications:   •  lactated ringers infusion, 125 mL/hr, Intravenous, Continuous, New Bag at 10/10/23 0849    No Known Allergies    Objective     /79   Pulse 85   Temp 97.5 °F (36.4 °C) (Temporal)   Resp 18   Ht 5' 4" (1.626 m)   Wt 66.7 kg (147 lb)   LMP  (LMP Unknown)   SpO2 98%   BMI 25.23 kg/m²       PHYSICAL EXAM    Gen: NAD  Head: NCAT  CV: RRR  CHEST: Clear  ABD: soft, NT/ND  EXT: no edema      ASSESSMENT/PLAN:  This is a 77y.o. year old female here for colonoscopy, and she is stable and optimized for her procedure.

## 2023-10-13 PROCEDURE — 88305 TISSUE EXAM BY PATHOLOGIST: CPT | Performed by: PATHOLOGY

## 2023-10-13 NOTE — RESULT ENCOUNTER NOTE
Please inform patient that the polyp I removed from her colon is a benign precancerous polyp that was completely removed. There is no need for her to schedule the ultrasound of the pelvis or see her gynecologist urgently as the palpable mass was from her pessary. Recall for repeat colonoscopy in 3 years provided she remains in good health. Please arrange for follow-up office visit with me for about 4 to 5 months. I am curious whether or not the pessary could be playing a role in her fecal incontinence as it did seem to be pushing on the rectum.   Thank you  Thank you

## 2023-11-13 ENCOUNTER — OFFICE VISIT (OUTPATIENT)
Dept: FAMILY MEDICINE CLINIC | Facility: CLINIC | Age: 78
End: 2023-11-13
Payer: MEDICARE

## 2023-11-13 VITALS
OXYGEN SATURATION: 98 % | TEMPERATURE: 96.6 F | RESPIRATION RATE: 16 BRPM | HEART RATE: 66 BPM | WEIGHT: 149.2 LBS | HEIGHT: 64 IN | BODY MASS INDEX: 25.47 KG/M2 | DIASTOLIC BLOOD PRESSURE: 78 MMHG | SYSTOLIC BLOOD PRESSURE: 124 MMHG

## 2023-11-13 DIAGNOSIS — E55.9 VITAMIN D DEFICIENCY: ICD-10-CM

## 2023-11-13 DIAGNOSIS — Z00.00 ENCOUNTER FOR ANNUAL WELLNESS VISIT (AWV) IN MEDICARE PATIENT: Primary | ICD-10-CM

## 2023-11-13 DIAGNOSIS — I10 PRIMARY HYPERTENSION: ICD-10-CM

## 2023-11-13 DIAGNOSIS — N18.31 STAGE 3A CHRONIC KIDNEY DISEASE (HCC): ICD-10-CM

## 2023-11-13 DIAGNOSIS — D64.9 ANEMIA, UNSPECIFIED TYPE: ICD-10-CM

## 2023-11-13 DIAGNOSIS — I63.9 CEREBROVASCULAR ACCIDENT (CVA), UNSPECIFIED MECHANISM (HCC): ICD-10-CM

## 2023-11-13 DIAGNOSIS — E03.8 OTHER SPECIFIED HYPOTHYROIDISM: ICD-10-CM

## 2023-11-13 PROCEDURE — 99214 OFFICE O/P EST MOD 30 MIN: CPT | Performed by: INTERNAL MEDICINE

## 2023-11-13 PROCEDURE — G0439 PPPS, SUBSEQ VISIT: HCPCS | Performed by: INTERNAL MEDICINE

## 2023-11-13 NOTE — PATIENT INSTRUCTIONS
Medicare Preventive Visit Patient Instructions  Thank you for completing your Welcome to Medicare Visit or Medicare Annual Wellness Visit today. Your next wellness visit will be due in one year (11/13/2024). The screening/preventive services that you may require over the next 5-10 years are detailed below. Some tests may not apply to you based off risk factors and/or age. Screening tests ordered at today's visit but not completed yet may show as past due. Also, please note that scanned in results may not display below. Preventive Screenings:  Service Recommendations Previous Testing/Comments   Colorectal Cancer Screening  * Colonoscopy    * Fecal Occult Blood Test (FOBT)/Fecal Immunochemical Test (FIT)  * Fecal DNA/Cologuard Test  * Flexible Sigmoidoscopy Age: 43-73 years old   Colonoscopy: every 10 years (may be performed more frequently if at higher risk)  OR  FOBT/FIT: every 1 year  OR  Cologuard: every 3 years  OR  Sigmoidoscopy: every 5 years  Screening may be recommended earlier than age 39 if at higher risk for colorectal cancer. Also, an individualized decision between you and your healthcare provider will decide whether screening between the ages of 77-80 would be appropriate. Colonoscopy: 10/10/2023  FOBT/FIT: Not on file  Cologuard: Not on file  Sigmoidoscopy: Not on file    Screening Current     Breast Cancer Screening Age: 36 years old  Frequency: every 1-2 years  Not required if history of left and right mastectomy Mammogram: 06/26/2023    Screening Current   Cervical Cancer Screening Between the ages of 21-29, pap smear recommended once every 3 years. Between the ages of 32-69, can perform pap smear with HPV co-testing every 5 years.    Recommendations may differ for women with a history of total hysterectomy, cervical cancer, or abnormal pap smears in past. Pap Smear: 05/09/2022    Screening Not Indicated   Hepatitis C Screening Once for adults born between 1945 and 1965  More frequently in patients at high risk for Hepatitis C Hep C Antibody: Not on file        Diabetes Screening 1-2 times per year if you're at risk for diabetes or have pre-diabetes Fasting glucose: 113 mg/dL (5/11/2023)  A1C: 5.3 % (11/2/2022)  Screening Current   Cholesterol Screening Once every 5 years if you don't have a lipid disorder. May order more often based on risk factors. Lipid panel: 11/02/2022    Screening Not Indicated  History Lipid Disorder     Other Preventive Screenings Covered by Medicare:  Abdominal Aortic Aneurysm (AAA) Screening: covered once if your at risk. You're considered to be at risk if you have a family history of AAA. Lung Cancer Screening: covers low dose CT scan once per year if you meet all of the following conditions: (1) Age 48-67; (2) No signs or symptoms of lung cancer; (3) Current smoker or have quit smoking within the last 15 years; (4) You have a tobacco smoking history of at least 20 pack years (packs per day multiplied by number of years you smoked); (5) You get a written order from a healthcare provider. Glaucoma Screening: covered annually if you're considered high risk: (1) You have diabetes OR (2) Family history of glaucoma OR (3)  aged 48 and older OR (3)  American aged 72 and older  Osteoporosis Screening: covered every 2 years if you meet one of the following conditions: (1) You're estrogen deficient and at risk for osteoporosis based off medical history and other findings; (2) Have a vertebral abnormality; (3) On glucocorticoid therapy for more than 3 months; (4) Have primary hyperparathyroidism; (5) On osteoporosis medications and need to assess response to drug therapy. Last bone density test (DXA Scan): 05/25/2022. HIV Screening: covered annually if you're between the age of 14-79. Also covered annually if you are younger than 13 and older than 72 with risk factors for HIV infection.  For pregnant patients, it is covered up to 3 times per pregnancy. Immunizations:  Immunization Recommendations   Influenza Vaccine Annual influenza vaccination during flu season is recommended for all persons aged >= 6 months who do not have contraindications   Pneumococcal Vaccine   * Pneumococcal conjugate vaccine = PCV13 (Prevnar 13), PCV15 (Vaxneuvance), PCV20 (Prevnar 20)  * Pneumococcal polysaccharide vaccine = PPSV23 (Pneumovax) Adults 41-81 yo with certain risk factors or if 69+ yo  If never received any pneumonia vaccine: recommend Prevnar 20 (PCV20)  Give PCV20 if previously received 1 dose of PCV13 or PPSV23   Hepatitis B Vaccine 3 dose series if at intermediate or high risk (ex: diabetes, end stage renal disease, liver disease)   Respiratory syncytial virus (RSV) Vaccine - COVERED BY MEDICARE PART D  * RSVPreF3 (Arexvy) CDC recommends that adults 61years of age and older may receive a single dose of RSV vaccine using shared clinical decision-making (SCDM)   Tetanus (Td) Vaccine - COST NOT COVERED BY MEDICARE PART B Following completion of primary series, a booster dose should be given every 10 years to maintain immunity against tetanus. Td may also be given as tetanus wound prophylaxis. Tdap Vaccine - COST NOT COVERED BY MEDICARE PART B Recommended at least once for all adults. For pregnant patients, recommended with each pregnancy.    Shingles Vaccine (Shingrix) - COST NOT COVERED BY MEDICARE PART B  2 shot series recommended in those 19 years and older who have or will have weakened immune systems or those 50 years and older     Health Maintenance Due:      Topic Date Due   • Hepatitis C Screening  Never done   • Lung Cancer Screening  11/22/2023   • Colorectal Cancer Screening  10/09/2026     Immunizations Due:      Topic Date Due   • Hepatitis A Vaccine (1 of 2 - Risk 2-dose series) Never done   • Hepatitis B Vaccine (1 of 3 - Risk 3-dose series) Never done   • Pneumococcal Vaccine: 65+ Years (1 - PCV) Never done   • COVID-19 Vaccine (4 - Madhu Dull series) 01/03/2022     Advance Directives   What are advance directives? Advance directives are legal documents that state your wishes and plans for medical care. These plans are made ahead of time in case you lose your ability to make decisions for yourself. Advance directives can apply to any medical decision, such as the treatments you want, and if you want to donate organs. What are the types of advance directives? There are many types of advance directives, and each state has rules about how to use them. You may choose a combination of any of the following:  Living will: This is a written record of the treatment you want. You can also choose which treatments you do not want, which to limit, and which to stop at a certain time. This includes surgery, medicine, IV fluid, and tube feedings. Durable power of  for [facility]): This is a written record that states who you want to make healthcare choices for you when you are unable to make them for yourself. This person, called a proxy, is usually a family member or a friend. You may choose more than 1 proxy. Do not resuscitate (DNR) order:  A DNR order is used in case your heart stops beating or you stop breathing. It is a request not to have certain forms of treatment, such as CPR. A DNR order may be included in other types of advance directives. Medical directive: This covers the care that you want if you are in a coma, near death, or unable to make decisions for yourself. You can list the treatments you want for each condition. Treatment may include pain medicine, surgery, blood transfusions, dialysis, IV or tube feedings, and a ventilator (breathing machine). Values history: This document has questions about your views, beliefs, and how you feel and think about life. This information can help others choose the care that you would choose. Why are advance directives important? An advance directive helps you control your care.  Although spoken wishes may be used, it is better to have your wishes written down. Spoken wishes can be misunderstood, or not followed. Treatments may be given even if you do not want them. An advance directive may make it easier for your family to make difficult choices about your care. Urinary Incontinence   Urinary incontinence (UI)  is when you lose control of your bladder. UI develops because your bladder cannot store or empty urine properly. The 3 most common types of UI are stress incontinence, urge incontinence, or both. Medicines:   May be given to help strengthen your bladder control. Report any side effects of medication to your healthcare provider. Do pelvic muscle exercises often:  Your pelvic muscles help you stop urinating. Squeeze these muscles tight for 5 seconds, then relax for 5 seconds. Gradually work up to squeezing for 10 seconds. Do 3 sets of 15 repetitions a day, or as directed. This will help strengthen your pelvic muscles and improve bladder control. Train your bladder:  Go to the bathroom at set times, such as every 2 hours, even if you do not feel the urge to go. You can also try to hold your urine when you feel the urge to go. For example, hold your urine for 5 minutes when you feel the urge to go. As that becomes easier, hold your urine for 10 minutes. Self-care:   Keep a UI record. Write down how often you leak urine and how much you leak. Make a note of what you were doing when you leaked urine. Drink liquids as directed. You may need to limit the amount of liquid you drink to help control your urine leakage. Do not drink any liquid right before you go to bed. Limit or do not have drinks that contain caffeine or alcohol. Prevent constipation. Eat a variety of high-fiber foods. Good examples are high-fiber cereals, beans, vegetables, and whole-grain breads. Walking is the best way to trigger your intestines to have a bowel movement. Exercise regularly and maintain a healthy weight. Weight loss and exercise will decrease pressure on your bladder and help you control your leakage. Use a catheter as directed  to help empty your bladder. A catheter is a tiny, plastic tube that is put into your bladder to drain your urine. Go to behavior therapy as directed. Behavior therapy may be used to help you learn to control your urge to urinate. Weight Management   Why it is important to manage your weight:  Being overweight increases your risk of health conditions such as heart disease, high blood pressure, type 2 diabetes, and certain types of cancer. It can also increase your risk for osteoarthritis, sleep apnea, and other respiratory problems. Aim for a slow, steady weight loss. Even a small amount of weight loss can lower your risk of health problems. How to lose weight safely:  A safe and healthy way to lose weight is to eat fewer calories and get regular exercise. You can lose up about 1 pound a week by decreasing the number of calories you eat by 500 calories each day. Healthy meal plan for weight management:  A healthy meal plan includes a variety of foods, contains fewer calories, and helps you stay healthy. A healthy meal plan includes the following:  Eat whole-grain foods more often. A healthy meal plan should contain fiber. Fiber is the part of grains, fruits, and vegetables that is not broken down by your body. Whole-grain foods are healthy and provide extra fiber in your diet. Some examples of whole-grain foods are whole-wheat breads and pastas, oatmeal, brown rice, and bulgur. Eat a variety of vegetables every day. Include dark, leafy greens such as spinach, kale, jd greens, and mustard greens. Eat yellow and orange vegetables such as carrots, sweet potatoes, and winter squash. Eat a variety of fruits every day. Choose fresh or canned fruit (canned in its own juice or light syrup) instead of juice. Fruit juice has very little or no fiber. Eat low-fat dairy foods. Drink fat-free (skim) milk or 1% milk. Eat fat-free yogurt and low-fat cottage cheese. Try low-fat cheeses such as mozzarella and other reduced-fat cheeses. Choose meat and other protein foods that are low in fat. Choose beans or other legumes such as split peas or lentils. Choose fish, skinless poultry (chicken or turkey), or lean cuts of red meat (beef or pork). Before you cook meat or poultry, cut off any visible fat. Use less fat and oil. Try baking foods instead of frying them. Add less fat, such as margarine, sour cream, regular salad dressing and mayonnaise to foods. Eat fewer high-fat foods. Some examples of high-fat foods include french fries, doughnuts, ice cream, and cakes. Eat fewer sweets. Limit foods and drinks that are high in sugar. This includes candy, cookies, regular soda, and sweetened drinks. Exercise:  Exercise at least 30 minutes per day on most days of the week. Some examples of exercise include walking, biking, dancing, and swimming. You can also fit in more physical activity by taking the stairs instead of the elevator or parking farther away from stores. Ask your healthcare provider about the best exercise plan for you. © Copyright [address] 2018 Information is for End User's use only and may not be sold, redistributed or otherwise used for commercial purposes.  All illustrations and images included in CareNotes® are the copyrighted property of [employer] or [address] [place], [place] [address]ols

## 2023-11-13 NOTE — PROGRESS NOTES
Assessment and Plan:     Problem List Items Addressed This Visit     Primary hypertension    Relevant Orders    CBC and differential    Comprehensive metabolic panel    Cerebrovascular accident (CVA) ([address])    Relevant Orders    CBC and differential    Comprehensive metabolic panel    Lipid Panel with Direct LDL reflex    Vitamin D deficiency    Other specified hypothyroidism    Relevant Orders    TSH, 3rd generation with Free T4 reflex    Stage 3a chronic kidney disease ([address])    Relevant Orders    CBC and differential    Comprehensive metabolic panel    Anemia    Relevant Orders    CBC and differential    Comprehensive metabolic panel   Other Visit Diagnoses     Encounter for annual wellness visit (AWV) in Medicare patient    -  Primary        BMI Counseling: Body mass index is 25.61 kg/m². The BMI is above normal. Nutrition recommendations include reducing intake of saturated and trans fat and reducing intake of cholesterol. Exercise recommendations include exercising 3-5 times per week. Rationale for BMI follow-up plan is due to patient being overweight or obese. Depression Screening and Follow-up Plan: Patient was screened for depression during today's encounter. They screened negative with a PHQ-2 score of 0. Urinary Incontinence Plan of Care: counseling topics discussed: practice Kegel (pelvic floor strengthening) exercises, limiting fluid intake 3-4 hours before bed and preventing constipation. Preventive health issues were discussed with patient, and age appropriate screening tests were ordered as noted in patient's After Visit Summary. Personalized health advice and appropriate referrals for health education or preventive services given if needed, as noted in patient's After Visit Summary. History of Present Illness:     Patient presents for a Medicare Wellness Visit    70yo female with history of HTN, CVA, hypothyroidism here for follow up/AWV. Doing well, no major concerns today. Underwent colonoscopy last month due to constipation and fecal incontinence. HANY revealed mass-like protrusion on rectum, possibly from her pessary. She reports still having constipation issues and occasional fecal urgency. Patient Care Team:  Colt Escamilla MD as PCP - General (Internal Medicine)  Gaetano Winslow DO (Gastroenterology)     Review of Systems:     Review of Systems   Constitutional:  Negative for chills, fatigue, fever and unexpected weight change. HENT:  Negative for congestion, postnasal drip, rhinorrhea, sore throat and trouble swallowing. Eyes:  Negative for pain, redness, itching and visual disturbance. Respiratory:  Negative for cough, chest tightness, shortness of breath and wheezing. Cardiovascular:  Negative for chest pain, palpitations and leg swelling. Gastrointestinal:  Positive for constipation and diarrhea. Negative for abdominal pain, blood in stool, nausea and vomiting. Endocrine: Negative for cold intolerance, heat intolerance, polydipsia and polyuria. Genitourinary:  Negative for dysuria, frequency, hematuria and urgency. Musculoskeletal:  Negative for arthralgias, back pain and joint swelling. Skin:  Negative for rash. Neurological:  Negative for dizziness, tremors, weakness, light-headedness, numbness and headaches. Psychiatric/Behavioral:  Negative for confusion, dysphoric mood, hallucinations, sleep disturbance and suicidal ideas. The patient is not nervous/anxious.          Problem List:     Patient Active Problem List   Diagnosis   • Primary hypertension   • Cerebrovascular accident (CVA) ([address])   • Memory loss   • Ambulatory dysfunction   • Quadrant anopia, left   • Vitamin D deficiency   • Other specified hypothyroidism   • Procidentia of uterus   • Mixed stress and urge urinary incontinence   • Stage 3a chronic kidney disease ([address])   • Seborrheic keratoses   • Incontinence of feces   • Change in bowel habits   • Anemia      Past Medical and Surgical History:     Past Medical History:   Diagnosis Date   • Hypertension    • TIA (transient ischemic attack)          Past Surgical History:   Procedure Laterality Date   • APPENDECTOMY     • CHOLECYSTECTOMY        Family History:     Family History   Problem Relation Age of Onset   • Stroke Mother    • Diabetes Father    • Diabetes Sister    • Cancer Sister         brain cancer   • No Known Problems Maternal Grandmother    • No Known Problems Paternal Grandmother    • No Known Problems Paternal Aunt    • No Known Problems Paternal Aunt       Social History:     Social History     Socioeconomic History   • Marital status: /Civil Union     Spouse name: None   • Number of children: None   • Years of education: None   • Highest education level: None   Occupational History   • None   Tobacco Use   • Smoking status: Former     Packs/day: 0.50     Years: 40.00     Total pack years: 20.00     Types: Cigarettes     Quit date:      Years since quittin.8   • Smokeless tobacco: Never   • Tobacco comments:     Social    Vaping Use   • Vaping Use: Never used   Substance and Sexual Activity   • Alcohol use: Never   • Drug use: Never   • Sexual activity: Not Currently   Other Topics Concern   • None   Social History Narrative   • None     Social Determinants of Health     Financial Resource Strain: Low Risk  (2022)    Overall Financial Resource Strain (CARDIA)    • Difficulty of Paying Living Expenses: Not hard at all   Food Insecurity: Not on file   Transportation Needs: No Transportation Needs (2022)    PRAPARE - Transportation    • Lack of Transportation (Medical): No    • Lack of Transportation (Non-Medical):  No   Physical Activity: Not on file   Stress: Not on file   Social Connections: Not on file   Intimate Partner Violence: Not on file   Housing Stability: Not on file      Medications and Allergies:     Current Outpatient Medications   Medication Sig Dispense Refill   • amLODIPine (NORVASC) 10 mg tablet Take 1 tablet (10 mg total) by mouth daily 90 tablet 3   • aspirin (ECOTRIN LOW STRENGTH) 81 mg EC tablet Take 1 tablet (81 mg total) by mouth daily 90 tablet 1   • atorvastatin (LIPITOR) 40 mg tablet Take 1 tablet (40 mg total) by mouth every evening 90 tablet 3   • estradiol (ESTRACE VAGINAL) 0.1 mg/g vaginal cream Insert 1 g into the vagina once a week 42.5 g 1   • levothyroxine 88 mcg tablet Take 1 tablet (88 mcg total) by mouth daily in the early morning 90 tablet 3   • metoprolol succinate (Toprol XL) 25 mg 24 hr tablet Take 1 tablet (25 mg total) by mouth daily 90 tablet 3     No current facility-administered medications for this visit. No Known Allergies   Immunizations:     Immunization History   Administered Date(s) Administered   • COVID-19 MODERNA VACC 0.25 ML IM BOOSTER 11/08/2021   • COVID-19 MODERNA VACC 0.5 ML IM 01/21/2021, 02/17/2021   • INFLUENZA 10/20/2015, 10/03/2016, 10/26/2017, 10/12/2018, 09/21/2020, 09/27/2022   • Influenza, high dose seasonal 0.7 mL 10/18/2021      Health Maintenance:         Topic Date Due   • Hepatitis C Screening  Never done   • Lung Cancer Screening  11/22/2023   • Colorectal Cancer Screening  10/09/2026         Topic Date Due   • Hepatitis A Vaccine (1 of 2 - Risk 2-dose series) Never done   • Hepatitis B Vaccine (1 of 3 - Risk 3-dose series) Never done   • Pneumococcal Vaccine: 65+ Years (1 - PCV) Never done   • COVID-19 Vaccine (4 - Ashley Goltz series) 01/03/2022      Medicare Screening Tests and Risk Assessments:     Nik Clark is here for her Subsequent Wellness visit. Health Risk Assessment:   Patient rates overall health as good. Patient feels that their physical health rating is same. Patient is satisfied with their life. Eyesight was rated as same. Hearing was rated as same. Patient feels that their emotional and mental health rating is same. Patients states they are never, rarely angry.  Patient states they are sometimes unusually tired/fatigued. Pain experienced in the last 7 days has been none. Patient states that she has experienced weight loss or gain in last 6 months. Depression Screening:   PHQ-2 Score: 0      Fall Risk Screening: In the past year, patient has experienced: no history of falling in past year      Urinary Incontinence Screening:   Patient has leaked urine accidently in the last six months. Home Safety:  Patient does not have trouble with stairs inside or outside of their home. Patient has working smoke alarms and has working carbon monoxide detector. Home safety hazards include: none. Nutrition:   Current diet is Regular. Medications:   Patient is not currently taking any over-the-counter supplements. Patient is able to manage medications. Activities of Daily Living (ADLs)/Instrumental Activities of Daily Living (IADLs):   Walk and transfer into and out of bed and chair?: Yes  Dress and groom yourself?: Yes    Bathe or shower yourself?: Yes    Feed yourself? Yes  Do your laundry/housekeeping?: Yes  Manage your money, pay your bills and track your expenses?: No  Make your own meals?: Yes    Do your own shopping?: Yes    ADL comments:  manages money, pays bills    Previous Hospitalizations:   Any hospitalizations or ED visits within the last 12 months?: No      Advance Care Planning:   Living will: Yes    Durable POA for healthcare:  Yes    Advanced directive: Yes    Advanced directive counseling given: Yes    ACP document given: Yes    Patient declined ACP directive: No    End of Life Decisions reviewed with patient: Yes    Provider agrees with end of life decisions: Yes      Comments: Has advanced directives at home but unsure what it says  Sons and daughter in laws are POA      Cognitive Screening:   Provider or family/friend/caregiver concerned regarding cognition?: No    PREVENTIVE SCREENINGS      Cardiovascular Screening:    General: Screening Not Indicated and History Lipid Disorder      Diabetes Screening:     General: Screening Current      Colorectal Cancer Screening:     General: Screening Current      Breast Cancer Screening:     General: Screening Current      Cervical Cancer Screening:    General: Screening Not Indicated      Osteoporosis Screening:    General: Screening Current      Abdominal Aortic Aneurysm (AAA) Screening:        General: Screening Not Indicated      Lung Cancer Screening:     General: Screening Not Indicated      Hepatitis C Screening:    General: Screening Not Indicated    Screening, Brief Intervention, and Referral to Treatment (SBIRT)    Screening  Typical number of drinks in a day: 0  Typical number of drinks in a week: 0  Interpretation: Low risk drinking behavior. Single Item Drug Screening:  How often have you used an illegal drug (including marijuana) or a prescription medication for non-medical reasons in the past year? never    Single Item Drug Screen Score: 0  Interpretation: Negative screen for possible drug use disorder    Other Counseling Topics:   Skin self-exam, sunscreen and calcium and vitamin D intake and regular weightbearing exercise. Pt no longer drives  Does not see dermatologist    No results found. Physical Exam:     /78   Pulse 66   Temp (!) 96.6 °F (35.9 °C) (Tympanic)   Resp 16   Ht 5' 4" (1.626 m)   Wt 67.7 kg (149 lb 3.2 oz)   LMP  (LMP Unknown)   SpO2 98%   BMI 25.61 kg/m²     Physical Exam  Vitals reviewed. Constitutional:       General: She is not in acute distress. Appearance: She is normal weight. HENT:      Head: Normocephalic and atraumatic. Right Ear: There is impacted cerumen. Left Ear: There is impacted cerumen. Mouth/Throat:      Pharynx: No oropharyngeal exudate or posterior oropharyngeal erythema. Cardiovascular:      Rate and Rhythm: Normal rate and regular rhythm. Heart sounds: No murmur heard. No friction rub. No gallop.    Pulmonary:      Effort: Pulmonary effort is normal. No respiratory distress. Breath sounds: No wheezing, rhonchi or rales. Abdominal:      General: Bowel sounds are normal. There is distension. Palpations: Abdomen is soft. There is no shifting dullness, fluid wave or mass. Tenderness: There is no abdominal tenderness. There is no guarding or rebound. Lymphadenopathy:      Cervical: No cervical adenopathy. Neurological:      Mental Status: She is alert. Motor: Motor function is intact. No weakness. Gait: Gait is intact. Psychiatric:         Mood and Affect: Mood and affect normal.         Speech: Speech normal.         Behavior: Behavior normal. Behavior is cooperative.           Estela Lemus MD

## 2023-11-16 ENCOUNTER — APPOINTMENT (OUTPATIENT)
Dept: LAB | Facility: CLINIC | Age: 78
End: 2023-11-16
Payer: MEDICARE

## 2023-11-16 DIAGNOSIS — I63.9 CEREBROVASCULAR ACCIDENT (CVA), UNSPECIFIED MECHANISM (HCC): ICD-10-CM

## 2023-11-16 DIAGNOSIS — I10 PRIMARY HYPERTENSION: ICD-10-CM

## 2023-11-16 DIAGNOSIS — D64.9 ANEMIA, UNSPECIFIED TYPE: ICD-10-CM

## 2023-11-16 DIAGNOSIS — E03.8 OTHER SPECIFIED HYPOTHYROIDISM: ICD-10-CM

## 2023-11-16 DIAGNOSIS — N18.31 STAGE 3A CHRONIC KIDNEY DISEASE (HCC): ICD-10-CM

## 2023-11-16 LAB
ALBUMIN SERPL BCP-MCNC: 4.3 G/DL (ref 3.5–5)
ALP SERPL-CCNC: 110 U/L (ref 34–104)
ALT SERPL W P-5'-P-CCNC: 9 U/L (ref 7–52)
ANION GAP SERPL CALCULATED.3IONS-SCNC: 8 MMOL/L
AST SERPL W P-5'-P-CCNC: 17 U/L (ref 13–39)
BASOPHILS # BLD AUTO: 0.05 THOUSANDS/ÂΜL (ref 0–0.1)
BASOPHILS NFR BLD AUTO: 1 % (ref 0–1)
BILIRUB SERPL-MCNC: 0.66 MG/DL (ref 0.2–1)
BUN SERPL-MCNC: 21 MG/DL (ref 5–25)
CALCIUM SERPL-MCNC: 10.3 MG/DL (ref 8.4–10.2)
CHLORIDE SERPL-SCNC: 104 MMOL/L (ref 96–108)
CHOLEST SERPL-MCNC: 151 MG/DL
CO2 SERPL-SCNC: 26 MMOL/L (ref 21–32)
CREAT SERPL-MCNC: 1.22 MG/DL (ref 0.6–1.3)
EOSINOPHIL # BLD AUTO: 0.28 THOUSAND/ÂΜL (ref 0–0.61)
EOSINOPHIL NFR BLD AUTO: 4 % (ref 0–6)
ERYTHROCYTE [DISTWIDTH] IN BLOOD BY AUTOMATED COUNT: 14.9 % (ref 11.6–15.1)
GFR SERPL CREATININE-BSD FRML MDRD: 42 ML/MIN/1.73SQ M
GLUCOSE P FAST SERPL-MCNC: 79 MG/DL (ref 65–99)
HCT VFR BLD AUTO: 36.2 % (ref 34.8–46.1)
HDLC SERPL-MCNC: 41 MG/DL
HGB BLD-MCNC: 11.5 G/DL (ref 11.5–15.4)
IMM GRANULOCYTES # BLD AUTO: 0.03 THOUSAND/UL (ref 0–0.2)
IMM GRANULOCYTES NFR BLD AUTO: 0 % (ref 0–2)
LDLC SERPL CALC-MCNC: 80 MG/DL (ref 0–100)
LYMPHOCYTES # BLD AUTO: 1.55 THOUSANDS/ÂΜL (ref 0.6–4.47)
LYMPHOCYTES NFR BLD AUTO: 21 % (ref 14–44)
MCH RBC QN AUTO: 30.2 PG (ref 26.8–34.3)
MCHC RBC AUTO-ENTMCNC: 31.8 G/DL (ref 31.4–37.4)
MCV RBC AUTO: 95 FL (ref 82–98)
MONOCYTES # BLD AUTO: 0.57 THOUSAND/ÂΜL (ref 0.17–1.22)
MONOCYTES NFR BLD AUTO: 8 % (ref 4–12)
NEUTROPHILS # BLD AUTO: 4.78 THOUSANDS/ÂΜL (ref 1.85–7.62)
NEUTS SEG NFR BLD AUTO: 66 % (ref 43–75)
NRBC BLD AUTO-RTO: 0 /100 WBCS
PLATELET # BLD AUTO: 218 THOUSANDS/UL (ref 149–390)
PMV BLD AUTO: 10 FL (ref 8.9–12.7)
POTASSIUM SERPL-SCNC: 4.8 MMOL/L (ref 3.5–5.3)
PROT SERPL-MCNC: 7.9 G/DL (ref 6.4–8.4)
RBC # BLD AUTO: 3.81 MILLION/UL (ref 3.81–5.12)
SODIUM SERPL-SCNC: 138 MMOL/L (ref 135–147)
T4 FREE SERPL-MCNC: 0.84 NG/DL (ref 0.61–1.12)
TRIGL SERPL-MCNC: 150 MG/DL
TSH SERPL DL<=0.05 MIU/L-ACNC: 4.92 UIU/ML (ref 0.45–4.5)
WBC # BLD AUTO: 7.26 THOUSAND/UL (ref 4.31–10.16)

## 2023-11-16 PROCEDURE — 84439 ASSAY OF FREE THYROXINE: CPT

## 2023-11-16 PROCEDURE — 80053 COMPREHEN METABOLIC PANEL: CPT

## 2023-11-16 PROCEDURE — 85025 COMPLETE CBC W/AUTO DIFF WBC: CPT

## 2023-11-16 PROCEDURE — 80061 LIPID PANEL: CPT

## 2023-11-16 PROCEDURE — 84443 ASSAY THYROID STIM HORMONE: CPT

## 2023-11-16 PROCEDURE — 36415 COLL VENOUS BLD VENIPUNCTURE: CPT

## 2023-12-23 ENCOUNTER — OFFICE VISIT (OUTPATIENT)
Dept: URGENT CARE | Facility: CLINIC | Age: 78
End: 2023-12-23
Payer: MEDICARE

## 2023-12-23 VITALS
WEIGHT: 148.2 LBS | OXYGEN SATURATION: 96 % | RESPIRATION RATE: 20 BRPM | DIASTOLIC BLOOD PRESSURE: 90 MMHG | HEIGHT: 64 IN | SYSTOLIC BLOOD PRESSURE: 183 MMHG | BODY MASS INDEX: 25.3 KG/M2 | HEART RATE: 84 BPM | TEMPERATURE: 98 F

## 2023-12-23 DIAGNOSIS — R05.1 ACUTE COUGH: Primary | ICD-10-CM

## 2023-12-23 LAB
SARS-COV-2 AG UPPER RESP QL IA: NEGATIVE
VALID CONTROL: NORMAL

## 2023-12-23 PROCEDURE — G0463 HOSPITAL OUTPT CLINIC VISIT: HCPCS | Performed by: FAMILY MEDICINE

## 2023-12-23 PROCEDURE — 99213 OFFICE O/P EST LOW 20 MIN: CPT | Performed by: FAMILY MEDICINE

## 2023-12-23 PROCEDURE — 87811 SARS-COV-2 COVID19 W/OPTIC: CPT | Performed by: PHYSICIAN ASSISTANT

## 2023-12-23 NOTE — PATIENT INSTRUCTIONS
Rapid COVID test negative.  Discussed testing may have been done too early in process of infection, discussed over-the-counter/at home COVID tests which could be performed tomorrow or the next day.  To continue over-the-counter cough and cold medication.  All patient's questions answered and is agreeable with this plan.

## 2023-12-23 NOTE — PROGRESS NOTES
[facility]        NAME: Juanita Hernandez is a 78 y.o. female  : 1945    MRN: [id number]  DATE: 2023  TIME: 9:20 AM    Assessment and Plan   Acute cough [R05.1]  1. Acute cough  Poct Covid 19 Rapid Antigen Test            Patient Instructions     Patient Instructions   Rapid COVID test negative.  Discussed testing may have been done too early in process of infection, discussed over-the-counter/at home COVID tests which could be performed tomorrow or the next day.  To continue over-the-counter cough and cold medication.  All patient's questions answered and is agreeable with this plan.      Follow up with PCP in 3-5 days.  Proceed to  ER if symptoms worsen.    Chief Complaint     Chief Complaint   Patient presents with    Nasal Congestion     Nasal congestion with headache for 2 days, wants to be tested for Covid    Cough     Dry cough for 2 days, wants covid test         History of Present Illness       Patient is a 78-year-old female presenting today with cold-like symptoms x 1 day.  Patient notes she awoke yesterday with some nasal congestion followed by a dry cough that has persisted into today, has been taking over-the-counter Mucinex which has provided some relief of her symptoms, notes that she was at a gathering 5 days ago for [employer] event and believes she may have gotten sick there.  Is wanting to get a COVID test today.  Denies fever, chills, chest tightness, SOB, hemoptysis.        Review of Systems   Review of Systems   Constitutional:  Negative for chills, fatigue and fever.   HENT:  Positive for congestion. Negative for postnasal drip and sore throat.    Eyes:  Negative for redness and itching.   Respiratory:  Positive for cough. Negative for chest tightness and shortness of breath.    Cardiovascular:  Negative for chest pain.   Gastrointestinal:  Negative for diarrhea, nausea and vomiting.   Musculoskeletal:  Negative for arthralgias and myalgias.   Neurological:   "Negative for light-headedness and headaches.         Current Medications       Current Outpatient Medications:     amLODIPine (NORVASC) 10 mg tablet, Take 1 tablet (10 mg total) by mouth daily, Disp: 90 tablet, Rfl: 3    aspirin (ECOTRIN LOW STRENGTH) 81 mg EC tablet, Take 1 tablet (81 mg total) by mouth daily, Disp: 90 tablet, Rfl: 1    atorvastatin (LIPITOR) 40 mg tablet, Take 1 tablet (40 mg total) by mouth every evening, Disp: 90 tablet, Rfl: 3    estradiol (ESTRACE VAGINAL) 0.1 mg/g vaginal cream, Insert 1 g into the vagina once a week, Disp: 42.5 g, Rfl: 1    levothyroxine 88 mcg tablet, Take 1 tablet (88 mcg total) by mouth daily in the early morning, Disp: 90 tablet, Rfl: 3    metoprolol succinate (Toprol XL) 25 mg 24 hr tablet, Take 1 tablet (25 mg total) by mouth daily, Disp: 90 tablet, Rfl: 3    Current Allergies     Allergies as of 12/23/2023    (No Known Allergies)            The following portions of the patient's history were reviewed and updated as appropriate: allergies, current medications, past family history, past medical history, past social history, past surgical history and problem list.     Past Medical History:   Diagnosis Date    Hypertension     TIA (transient ischemic attack)     2021       Past Surgical History:   Procedure Laterality Date    APPENDECTOMY      CHOLECYSTECTOMY         Family History   Problem Relation Age of Onset    Stroke Mother     Diabetes Father     Diabetes Sister     Cancer Sister         brain cancer    No Known Problems Maternal Grandmother     No Known Problems Paternal Grandmother     No Known Problems Paternal Aunt     No Known Problems Paternal Aunt          Medications have been verified.        Objective   BP (!) 183/90   Pulse 84   Temp 98 °F (36.7 °C)   Resp 20   Ht 5' 4\" (1.626 m)   Wt 67.2 kg (148 lb 3.2 oz)   LMP  (LMP Unknown)   SpO2 96%   BMI 25.44 kg/m²        Physical Exam     Physical Exam  Vitals and nursing note reviewed. "   Constitutional:       General: She is not in acute distress.     Appearance: Normal appearance. She is not ill-appearing.   HENT:      Head: Normocephalic.      Right Ear: Tympanic membrane, ear canal and external ear normal.      Left Ear: Tympanic membrane, ear canal and external ear normal.      Nose: Congestion present.      Mouth/Throat:      Mouth: Mucous membranes are moist.      Pharynx: Oropharynx is clear.   Eyes:      Conjunctiva/sclera: Conjunctivae normal.   Cardiovascular:      Rate and Rhythm: Normal rate and regular rhythm.      Pulses: Normal pulses.      Heart sounds: Normal heart sounds.   Pulmonary:      Effort: Pulmonary effort is normal.      Breath sounds: Normal breath sounds. No wheezing, rhonchi or rales.   Musculoskeletal:      Cervical back: Normal range of motion.   Skin:     General: Skin is warm.      Capillary Refill: Capillary refill takes less than 2 seconds.   Neurological:      Mental Status: She is alert.

## 2023-12-26 ENCOUNTER — PROCEDURE VISIT (OUTPATIENT)
Dept: OBGYN CLINIC | Facility: CLINIC | Age: 78
End: 2023-12-26
Payer: MEDICARE

## 2023-12-26 VITALS
DIASTOLIC BLOOD PRESSURE: 70 MMHG | HEIGHT: 64 IN | WEIGHT: 147.8 LBS | BODY MASS INDEX: 25.23 KG/M2 | SYSTOLIC BLOOD PRESSURE: 130 MMHG

## 2023-12-26 DIAGNOSIS — N81.3 PROCIDENTIA OF UTERUS: ICD-10-CM

## 2023-12-26 DIAGNOSIS — N39.46 MIXED STRESS AND URGE URINARY INCONTINENCE: ICD-10-CM

## 2023-12-26 DIAGNOSIS — Z46.89 PESSARY MAINTENANCE: Primary | ICD-10-CM

## 2023-12-26 PROCEDURE — 99213 OFFICE O/P EST LOW 20 MIN: CPT | Performed by: ADVANCED PRACTICE MIDWIFE

## 2023-12-27 NOTE — PROGRESS NOTES
"Pessary    Date/Time: 12/26/2023 10:30 AM    Performed by: Rebeca Castro CNM  Authorized by: eRbeca Castro CNM  [place] Protocol:  Consent: Verbal consent obtained.  Risks and benefits: risks, benefits and alternatives were discussed  Consent given by: patient  Time out: Immediately prior to procedure a \"time out\" was called to verify the correct patient, procedure, equipment, support staff and site/side marked as required.  Patient understanding: patient states understanding of the procedure being performed  Patient consent: the patient's understanding of the procedure matches consent given  Procedure consent: procedure consent matches procedure scheduled  Relevant documents: relevant documents present and verified  Test results: test results available and properly labeled  Required items: required blood products, implants, devices, and special equipment available  Patient identity confirmed: verbally with patient    Indication:     Indication for pessary: uterine prolapse    Pre-procedure:     Pessary procedure type:  Cleaning/check  Problems:     Pessary complications: none    Procedure:     Pessary type:  Gellhorn flexible    Pessary size:  5    Patient tolerance of procedure:  Tolerated well, no immediate complications  Comments:     Procedure comments:  Spec exam: negative lesions, erosions, or discharge.     " Medication:      Pharmacy has been verified.    [x] Patient completely out of medication on Thurs., 5/20/21    Patient currently has follow up appointment scheduled    Message to Prescriber:

## 2024-01-26 DIAGNOSIS — E03.8 OTHER SPECIFIED HYPOTHYROIDISM: ICD-10-CM

## 2024-01-26 NOTE — TELEPHONE ENCOUNTER
Patient requesting refill(s) of: levothyroxine 88 mcg    Last filled: 1/30/23  Last appt: 11/13/23  Next appt: 05/13/24  Pharmacy: [facility] [place]

## 2024-01-26 NOTE — TELEPHONE ENCOUNTER
Reason for call:   [x] Refill   [] Prior Auth  [] Other:     Office:   [] PCP/Provider -   [x] Specialty/Provider - ORTHO    Medication: LEVOTHYROXINE    Dose/Frequency: 88 MCG    Quantity: 90    Pharmacy:   [id number] - [place] - [address]  [address], [place] [id number]     Does the patient have enough for 3 days?   [x] Yes   [] No - Send as HP to POD

## 2024-01-28 RX ORDER — LEVOTHYROXINE SODIUM 88 UG/1
88 TABLET ORAL
Qty: 90 TABLET | Refills: 3 | Status: SHIPPED | OUTPATIENT
Start: 2024-01-28

## 2024-02-19 DIAGNOSIS — I10 PRIMARY HYPERTENSION: ICD-10-CM

## 2024-02-19 RX ORDER — AMLODIPINE BESYLATE 10 MG/1
10 TABLET ORAL DAILY
Qty: 90 TABLET | Refills: 3 | Status: SHIPPED | OUTPATIENT
Start: 2024-02-19

## 2024-02-19 NOTE — TELEPHONE ENCOUNTER
Patient requesting refill(s) of: Amlodipine 10 mg     Last filled: 02/15/23  Last appt: 11/13/23  Next appt: 05/13/24  Pharmacy: [facility] [address]

## 2024-03-02 ENCOUNTER — DOCUMENTATION (OUTPATIENT)
Dept: GASTROENTEROLOGY | Facility: CLINIC | Age: 79
End: 2024-03-02

## 2024-03-02 NOTE — PROGRESS NOTES
Records reviewed and outlined below in preparation for office visit 3/4/2024;    11/16/2023 laboratory data  Sodium 138  Potassium 4.8  BUN 20  Creatinine 1.22  Calcium 10.3  AST 17  ALT 9  Alk phos 110  T. bili 0.66  Triglycerides 150  HDL 41  LDL 80  Cholesterol 151  WBC 7.26 Hgb 11.5 HCT 36.2 MCV 95 platelet count 218,000  TSH 4.915    10/10/2023 colonoscopy  Good preparation  Pediatric scope  3 mm polyp at 35 cm.  Biopsy unremarkable  12 mm polyp at 25 cm status post hot snare removal.  Tubular adenoma  Mild to moderate diverticulosis transverse through descending colon  Severe diverticulosis sigmoid colon  Palpable mass anteriorly.  This was patient's pessary      5/11/2023 laboratory data-outlined below  Iron was 57  Ferritin 23  Iron saturation 16%  TIBC 318 vitamin D 46  WBC 5.79 Hgb 11.3 HCT 35 MCV 90 platelet count 198,000

## 2024-04-11 DIAGNOSIS — E78.00 ELEVATED CHOLESTEROL: ICD-10-CM

## 2024-04-11 DIAGNOSIS — I63.9 CEREBROVASCULAR ACCIDENT (CVA), UNSPECIFIED MECHANISM (HCC): ICD-10-CM

## 2024-04-11 RX ORDER — ATORVASTATIN CALCIUM 40 MG/1
40 TABLET, FILM COATED ORAL EVERY EVENING
Qty: 90 TABLET | Refills: 3 | Status: SHIPPED | OUTPATIENT
Start: 2024-04-11

## 2024-04-11 NOTE — TELEPHONE ENCOUNTER
Patient requesting refill(s) of: atorvastatin 40 mg daily     Last filled: 7/17/2023 #90 x 3  Last appt: 11/13/2023  Next appt: 5/13/2024  Pharmacy: [facility] [address] [place]

## 2024-04-19 ENCOUNTER — PROCEDURE VISIT (OUTPATIENT)
Dept: OBGYN CLINIC | Facility: CLINIC | Age: 79
End: 2024-04-19
Payer: MEDICARE

## 2024-04-19 VITALS
HEIGHT: 64 IN | SYSTOLIC BLOOD PRESSURE: 120 MMHG | BODY MASS INDEX: 24.59 KG/M2 | WEIGHT: 144 LBS | DIASTOLIC BLOOD PRESSURE: 72 MMHG

## 2024-04-19 DIAGNOSIS — N81.3 PROCIDENTIA OF UTERUS: ICD-10-CM

## 2024-04-19 DIAGNOSIS — N76.3 CHRONIC VULVITIS: Primary | ICD-10-CM

## 2024-04-19 DIAGNOSIS — Z46.89 PESSARY MAINTENANCE: ICD-10-CM

## 2024-04-19 PROCEDURE — 99213 OFFICE O/P EST LOW 20 MIN: CPT | Performed by: ADVANCED PRACTICE MIDWIFE

## 2024-04-19 RX ORDER — CLOBETASOL PROPIONATE 0.5 MG/G
OINTMENT TOPICAL 2 TIMES DAILY
Qty: 45 G | Refills: 0 | Status: SHIPPED | OUTPATIENT
Start: 2024-04-19

## 2024-04-19 NOTE — PROGRESS NOTES
Pessary    Date/Time: 4/19/2024 9:30 AM    Performed by: Rebeca Castro CNM  Authorized by: Rebeca Castro CNM  [place] Protocol:  Consent: Verbal consent obtained.  Risks and benefits: risks, benefits and alternatives were discussed  Patient understanding: patient states understanding of the procedure being performed  Patient consent: the patient's understanding of the procedure matches consent given  Procedure consent: procedure consent matches procedure scheduled  Test results: test results available and properly labeled  Radiology Images displayed and confirmed. If images not available, report reviewed: imaging studies available  Required items: required blood products, implants, devices, and special equipment available  Patient identity confirmed: verbally with patient    Indication:     Indication for pessary: uterine prolapse and cystocele    Pre-procedure:     Pessary procedure type:  Cleaning/check  Problems:     Pessary complications: none    Procedure:     Pessary type:  Gellhorn flexible    Pessary size:  5    Patient tolerance of procedure:  Tolerated well, no immediate complications  Comments:     Procedure comments:  Denies bleeding, change in bowel or bladder. Has some incontinence and wears pads. Noted irritation outer vulvar area, denies itching or burning. Gelhorn removed with minimal difficulty. Small tear with removal, uses estrogen 1 once weekly. Will order Clobetasol cream to use vulvar area morning and night for 14 days. Speculum exam- no irritation posterior vagina and minimal discharge.

## 2024-04-30 DIAGNOSIS — I10 PRIMARY HYPERTENSION: ICD-10-CM

## 2024-04-30 RX ORDER — METOPROLOL SUCCINATE 25 MG/1
25 TABLET, EXTENDED RELEASE ORAL DAILY
Qty: 90 TABLET | Refills: 1 | Status: SHIPPED | OUTPATIENT
Start: 2024-04-30

## 2024-04-30 NOTE — TELEPHONE ENCOUNTER
Reason for call:   [x] Refill   [] Prior Auth  [] Other:     Office:   [x] PCP/Provider -   [] Specialty/Provider -     Medication:   metoprolol succinate (Toprol XL) 25 mg 24 hr tablet - Take 1 tablet (25 mg total) by mouth daily     Pharmacy:   [id number] - [place] - [address]     Does the patient have enough for 3 days?   [] Yes   [x] No - Send as HP to POD

## 2024-07-19 ENCOUNTER — PROCEDURE VISIT (OUTPATIENT)
Dept: OBGYN CLINIC | Facility: CLINIC | Age: 79
End: 2024-07-19
Payer: MEDICARE

## 2024-07-19 VITALS
BODY MASS INDEX: 24.59 KG/M2 | WEIGHT: 144 LBS | DIASTOLIC BLOOD PRESSURE: 70 MMHG | SYSTOLIC BLOOD PRESSURE: 118 MMHG | HEIGHT: 64 IN

## 2024-07-19 DIAGNOSIS — N81.3 PROCIDENTIA OF UTERUS: ICD-10-CM

## 2024-07-19 DIAGNOSIS — N39.46 MIXED STRESS AND URGE URINARY INCONTINENCE: ICD-10-CM

## 2024-07-19 DIAGNOSIS — R15.9 INCONTINENCE OF FECES, UNSPECIFIED FECAL INCONTINENCE TYPE: ICD-10-CM

## 2024-07-19 DIAGNOSIS — N95.2 ATROPHIC VAGINITIS: ICD-10-CM

## 2024-07-19 DIAGNOSIS — Z46.89 PESSARY MAINTENANCE: ICD-10-CM

## 2024-07-19 PROCEDURE — 99213 OFFICE O/P EST LOW 20 MIN: CPT | Performed by: ADVANCED PRACTICE MIDWIFE

## 2024-07-19 RX ORDER — ESTRADIOL 0.1 MG/G
1 CREAM VAGINAL 2 TIMES WEEKLY
Qty: 42.5 G | Refills: 1 | Status: SHIPPED | OUTPATIENT
Start: 2024-07-22

## 2024-07-19 RX ORDER — CALCIUM POLYCARBOPHIL 625 MG 625 MG/1
625 TABLET ORAL DAILY
COMMUNITY

## 2024-07-19 NOTE — PROGRESS NOTES
"Pessary    Date/Time: 7/19/2024 2:00 PM    Performed by: Rebeca Castro CNM  Authorized by: Rebeca Castro CNM  [place] Protocol:  Consent: Verbal consent obtained.  Risks and benefits: risks, benefits and alternatives were discussed  Consent given by: patient  Time out: Immediately prior to procedure a \"time out\" was called to verify the correct patient, procedure, equipment, support staff and site/side marked as required.  Patient understanding: patient states understanding of the procedure being performed  Patient consent: the patient's understanding of the procedure matches consent given  Procedure consent: procedure consent matches procedure scheduled  Relevant documents: relevant documents present and verified  Test results: test results available and properly labeled  Radiology Images displayed and confirmed. If images not available, report reviewed: imaging studies available  Required items: required blood products, implants, devices, and special equipment available  Patient identity confirmed: verbally with patient    Indication:     Indication for pessary: uterine prolapse    Pre-procedure:     Pessary procedure type:  Cleaning/check  Problems:     Pessary complications comment:  Changes with incontinence  Procedure:     Pessary type:  Gellhorn flexible    Pessary size:  5    Patient tolerance of procedure:  Tolerated well, no immediate complications  Comments:     Procedure comments:  Notes that incontinence is getting worse. Changing pad 4 times per day and getting up at night. Notes that she has no control over urinary flow. Has had improvement with fecal incontinence since starting fiber. States that she is interested in having surgical correction at this time. Referral provided to Uro-gyn      "

## 2024-07-31 ENCOUNTER — OFFICE VISIT (OUTPATIENT)
Dept: FAMILY MEDICINE CLINIC | Facility: CLINIC | Age: 79
End: 2024-07-31
Payer: MEDICARE

## 2024-07-31 VITALS
OXYGEN SATURATION: 97 % | HEART RATE: 78 BPM | TEMPERATURE: 97.4 F | BODY MASS INDEX: 24.75 KG/M2 | HEIGHT: 64 IN | DIASTOLIC BLOOD PRESSURE: 72 MMHG | RESPIRATION RATE: 18 BRPM | SYSTOLIC BLOOD PRESSURE: 128 MMHG | WEIGHT: 145 LBS

## 2024-07-31 DIAGNOSIS — R41.89 COGNITIVE IMPAIRMENT: ICD-10-CM

## 2024-07-31 DIAGNOSIS — E55.9 VITAMIN D DEFICIENCY: ICD-10-CM

## 2024-07-31 DIAGNOSIS — Z12.31 ENCOUNTER FOR SCREENING MAMMOGRAM FOR BREAST CANCER: ICD-10-CM

## 2024-07-31 DIAGNOSIS — F03.A0 MILD DEMENTIA WITHOUT BEHAVIORAL DISTURBANCE, PSYCHOTIC DISTURBANCE, MOOD DISTURBANCE, OR ANXIETY, UNSPECIFIED DEMENTIA TYPE (HCC): ICD-10-CM

## 2024-07-31 DIAGNOSIS — I10 PRIMARY HYPERTENSION: Primary | ICD-10-CM

## 2024-07-31 DIAGNOSIS — E03.8 OTHER SPECIFIED HYPOTHYROIDISM: ICD-10-CM

## 2024-07-31 DIAGNOSIS — I63.9 CEREBROVASCULAR ACCIDENT (CVA), UNSPECIFIED MECHANISM (HCC): ICD-10-CM

## 2024-07-31 DIAGNOSIS — N18.31 STAGE 3A CHRONIC KIDNEY DISEASE (HCC): ICD-10-CM

## 2024-07-31 DIAGNOSIS — N39.46 MIXED STRESS AND URGE URINARY INCONTINENCE: ICD-10-CM

## 2024-07-31 PROBLEM — D64.9 ANEMIA: Status: RESOLVED | Noted: 2023-07-17 | Resolved: 2024-07-31

## 2024-07-31 PROBLEM — R19.4 CHANGE IN BOWEL HABITS: Status: RESOLVED | Noted: 2023-07-17 | Resolved: 2024-07-31

## 2024-07-31 PROBLEM — R15.9 INCONTINENCE OF FECES: Status: RESOLVED | Noted: 2023-07-17 | Resolved: 2024-07-31

## 2024-07-31 PROCEDURE — G2211 COMPLEX E/M VISIT ADD ON: HCPCS | Performed by: INTERNAL MEDICINE

## 2024-07-31 PROCEDURE — 99214 OFFICE O/P EST MOD 30 MIN: CPT | Performed by: INTERNAL MEDICINE

## 2024-07-31 RX ORDER — SOLIFENACIN SUCCINATE 10 MG/1
10 TABLET, FILM COATED ORAL DAILY
Qty: 30 TABLET | Refills: 0 | Status: SHIPPED | OUTPATIENT
Start: 2024-07-31

## 2024-07-31 NOTE — LETTER
To Whom It May Concern:      2024      Juanita Hernandez ( 1945) is currently under my care and is currently suffering from cognitive impairment and early stage dementia. Therefore, she should not be Power of  for her sister, Rand Gerard. Please contact me with questions or concerns. Thank you.       Sincerely,      Mariela Whitaker MD

## 2024-07-31 NOTE — PROGRESS NOTES
Ambulatory Visit  Name: Juanita Hernandez      : 1945      MRN: [id number]  Encounter Provider: Mariela Whitaker MD  Encounter Date: 2024   Encounter department: [facility] PRIMARY CARE    Assessment & Plan   1. Primary hypertension  -     CBC and differential; Future  -     Comprehensive metabolic panel; Future  2. Cerebrovascular accident (CVA), unspecified mechanism (HCC)  -     CBC and differential; Future  -     Comprehensive metabolic panel; Future  -     Lipid Panel with Direct LDL reflex; Future  3. Stage 3a chronic kidney disease (HCC)  -     CBC and differential; Future  -     Comprehensive metabolic panel; Future  4. Mixed stress and urge urinary incontinence  -     solifenacin (VESICARE) 10 MG tablet; Take 1 tablet (10 mg total) by mouth daily  5. Cognitive impairment  -     CBC and differential; Future  -     Comprehensive metabolic panel; Future  -     Vitamin B12; Future  6. Vitamin D deficiency  -     Vitamin D 25 hydroxy; Future  7. Other specified hypothyroidism  -     TSH, 3rd generation with Free T4 reflex; Future  8. Encounter for screening mammogram for breast cancer  -     Mammo screening bilateral w 3d & cad; Future  9. Mild dementia without behavioral disturbance, psychotic disturbance, mood disturbance, or anxiety, unspecified dementia type (HCC)  -     Vitamin B12; Future       History of Present Illness     80yo female with history of HTN, CVA, hypothyroidism here for 6 month follow up. Niece is with her today.     Main concerns today include ongoing issues with urinary incontinence, frequency and continuous leakage of urine. Saw Gyn recently for pessary fitting and referred to Uro-Gyn. Has not tried any medications for it.     Struggling with constipation as well, using benefiber daily.     Memory impairment - family is concerned about issues with forgetting appointments, managing her finances. Pt agrees she's had trouble and no longer wants to be POA for  "disabled sister, Polina. Needs letter for bank to remove her from the account. Pt's son is her POA. No longer drives. Getting meal assistance.  passed away recently after prolonged illness.         Review of Systems   Constitutional:  Positive for fatigue. Negative for appetite change, chills and fever.   HENT:  Positive for hearing loss.    Respiratory:  Negative for chest tightness and shortness of breath.    Genitourinary:  Positive for enuresis, frequency and urgency. Negative for difficulty urinating and dysuria.   Neurological:  Negative for dizziness, light-headedness and headaches.     Current Outpatient Medications on File Prior to Visit   Medication Sig Dispense Refill   • amLODIPine (NORVASC) 10 mg tablet Take 1 tablet (10 mg total) by mouth daily 90 tablet 3   • aspirin (ECOTRIN LOW STRENGTH) 81 mg EC tablet Take 1 tablet (81 mg total) by mouth daily 90 tablet 1   • atorvastatin (LIPITOR) 40 mg tablet Take 1 tablet (40 mg total) by mouth every evening 90 tablet 3   • calcium polycarbophil (FIBERCON) 625 mg tablet Take 625 mg by mouth daily     • clobetasol (TEMOVATE) 0.05 % ointment Apply topically 2 (two) times a day 2 times per day for 14 days, the once weekly 45 g 0   • estradiol (ESTRACE VAGINAL) 0.1 mg/g vaginal cream Insert 1 g into the vagina 2 (two) times a week 42.5 g 1   • levothyroxine 88 mcg tablet Take 1 tablet (88 mcg total) by mouth daily in the early morning 90 tablet 3   • metoprolol succinate (Toprol XL) 25 mg 24 hr tablet Take 1 tablet (25 mg total) by mouth daily 90 tablet 1   • [DISCONTINUED] ergocalciferol (VITAMIN D2) 50,000 units Take 1 capsule (50,000 Units total) by mouth once a week 12 capsule 1     No current facility-administered medications on file prior to visit.      Objective     /72   Pulse 78   Temp (!) 97.4 °F (36.3 °C)   Resp 18   Ht 5' 4\" (1.626 m)   Wt 65.8 kg (145 lb)   LMP  (LMP Unknown)   SpO2 97%   BMI 24.89 kg/m²     Physical Exam  Vitals " reviewed.   Constitutional:       General: She is not in acute distress.     Appearance: She is normal weight.   HENT:      Right Ear: Tympanic membrane, ear canal and external ear normal.      Left Ear: Tympanic membrane, ear canal and external ear normal.   Cardiovascular:      Rate and Rhythm: Normal rate and regular rhythm.      Heart sounds: No murmur heard.     No friction rub. No gallop.   Pulmonary:      Effort: Pulmonary effort is normal. No respiratory distress.      Breath sounds: No wheezing, rhonchi or rales.   Neurological:      Mental Status: She is alert.      Motor: Motor function is intact. No weakness or tremor.      Gait: Gait is intact.   Psychiatric:         Mood and Affect: Mood and affect normal.         Behavior: Behavior normal. Behavior is cooperative.         Cognition and Memory: Memory is impaired.       Administrative Statements

## 2024-08-19 ENCOUNTER — APPOINTMENT (OUTPATIENT)
Dept: LAB | Facility: CLINIC | Age: 79
End: 2024-08-19
Payer: MEDICARE

## 2024-08-19 DIAGNOSIS — E03.8 OTHER SPECIFIED HYPOTHYROIDISM: ICD-10-CM

## 2024-08-19 DIAGNOSIS — I10 PRIMARY HYPERTENSION: ICD-10-CM

## 2024-08-19 DIAGNOSIS — N18.31 STAGE 3A CHRONIC KIDNEY DISEASE (HCC): ICD-10-CM

## 2024-08-19 DIAGNOSIS — I63.9 CEREBROVASCULAR ACCIDENT (CVA), UNSPECIFIED MECHANISM (HCC): ICD-10-CM

## 2024-08-19 DIAGNOSIS — E55.9 VITAMIN D DEFICIENCY: ICD-10-CM

## 2024-08-19 DIAGNOSIS — F03.A0 MILD DEMENTIA WITHOUT BEHAVIORAL DISTURBANCE, PSYCHOTIC DISTURBANCE, MOOD DISTURBANCE, OR ANXIETY, UNSPECIFIED DEMENTIA TYPE (HCC): ICD-10-CM

## 2024-08-19 DIAGNOSIS — R41.89 COGNITIVE IMPAIRMENT: ICD-10-CM

## 2024-08-19 LAB
25(OH)D3 SERPL-MCNC: 23.2 NG/ML (ref 30–100)
ALBUMIN SERPL BCG-MCNC: 4.3 G/DL (ref 3.5–5)
ALP SERPL-CCNC: 111 U/L (ref 34–104)
ALT SERPL W P-5'-P-CCNC: 8 U/L (ref 7–52)
ANION GAP SERPL CALCULATED.3IONS-SCNC: 5 MMOL/L (ref 4–13)
AST SERPL W P-5'-P-CCNC: 12 U/L (ref 13–39)
BASOPHILS # BLD AUTO: 0.02 THOUSANDS/ÂΜL (ref 0–0.1)
BASOPHILS NFR BLD AUTO: 0 % (ref 0–1)
BILIRUB SERPL-MCNC: 0.67 MG/DL (ref 0.2–1)
BUN SERPL-MCNC: 19 MG/DL (ref 5–25)
CALCIUM SERPL-MCNC: 9.6 MG/DL (ref 8.4–10.2)
CHLORIDE SERPL-SCNC: 106 MMOL/L (ref 96–108)
CHOLEST SERPL-MCNC: 149 MG/DL
CO2 SERPL-SCNC: 27 MMOL/L (ref 21–32)
CREAT SERPL-MCNC: 1.14 MG/DL (ref 0.6–1.3)
EOSINOPHIL # BLD AUTO: 0.15 THOUSAND/ÂΜL (ref 0–0.61)
EOSINOPHIL NFR BLD AUTO: 3 % (ref 0–6)
ERYTHROCYTE [DISTWIDTH] IN BLOOD BY AUTOMATED COUNT: 15.8 % (ref 11.6–15.1)
GFR SERPL CREATININE-BSD FRML MDRD: 45 ML/MIN/1.73SQ M
GLUCOSE P FAST SERPL-MCNC: 105 MG/DL (ref 65–99)
HCT VFR BLD AUTO: 36.2 % (ref 34.8–46.1)
HDLC SERPL-MCNC: 38 MG/DL
HGB BLD-MCNC: 11.4 G/DL (ref 11.5–15.4)
IMM GRANULOCYTES # BLD AUTO: 0.05 THOUSAND/UL (ref 0–0.2)
IMM GRANULOCYTES NFR BLD AUTO: 1 % (ref 0–2)
LDLC SERPL CALC-MCNC: 78 MG/DL (ref 0–100)
LYMPHOCYTES # BLD AUTO: 1.16 THOUSANDS/ÂΜL (ref 0.6–4.47)
LYMPHOCYTES NFR BLD AUTO: 20 % (ref 14–44)
MCH RBC QN AUTO: 28.9 PG (ref 26.8–34.3)
MCHC RBC AUTO-ENTMCNC: 31.5 G/DL (ref 31.4–37.4)
MCV RBC AUTO: 92 FL (ref 82–98)
MONOCYTES # BLD AUTO: 0.46 THOUSAND/ÂΜL (ref 0.17–1.22)
MONOCYTES NFR BLD AUTO: 8 % (ref 4–12)
NEUTROPHILS # BLD AUTO: 4.11 THOUSANDS/ÂΜL (ref 1.85–7.62)
NEUTS SEG NFR BLD AUTO: 68 % (ref 43–75)
NRBC BLD AUTO-RTO: 0 /100 WBCS
PLATELET # BLD AUTO: 211 THOUSANDS/UL (ref 149–390)
PMV BLD AUTO: 9.8 FL (ref 8.9–12.7)
POTASSIUM SERPL-SCNC: 4.2 MMOL/L (ref 3.5–5.3)
PROT SERPL-MCNC: 7.9 G/DL (ref 6.4–8.4)
RBC # BLD AUTO: 3.94 MILLION/UL (ref 3.81–5.12)
SODIUM SERPL-SCNC: 138 MMOL/L (ref 135–147)
TRIGL SERPL-MCNC: 167 MG/DL
TSH SERPL DL<=0.05 MIU/L-ACNC: 2.31 UIU/ML (ref 0.45–4.5)
VIT B12 SERPL-MCNC: 183 PG/ML (ref 180–914)
WBC # BLD AUTO: 5.95 THOUSAND/UL (ref 4.31–10.16)

## 2024-08-19 PROCEDURE — 84443 ASSAY THYROID STIM HORMONE: CPT

## 2024-08-19 PROCEDURE — 82607 VITAMIN B-12: CPT

## 2024-08-19 PROCEDURE — 85025 COMPLETE CBC W/AUTO DIFF WBC: CPT

## 2024-08-19 PROCEDURE — 36415 COLL VENOUS BLD VENIPUNCTURE: CPT

## 2024-08-19 PROCEDURE — 82306 VITAMIN D 25 HYDROXY: CPT

## 2024-08-19 PROCEDURE — 80053 COMPREHEN METABOLIC PANEL: CPT

## 2024-08-19 PROCEDURE — 80061 LIPID PANEL: CPT

## 2024-08-21 DIAGNOSIS — F03.A0 MILD DEMENTIA WITHOUT BEHAVIORAL DISTURBANCE, PSYCHOTIC DISTURBANCE, MOOD DISTURBANCE, OR ANXIETY, UNSPECIFIED DEMENTIA TYPE (HCC): ICD-10-CM

## 2024-08-21 DIAGNOSIS — D64.9 ANEMIA, UNSPECIFIED TYPE: Primary | ICD-10-CM

## 2024-08-21 DIAGNOSIS — E53.8 B12 DEFICIENCY: ICD-10-CM

## 2024-09-03 DIAGNOSIS — N39.46 MIXED STRESS AND URGE URINARY INCONTINENCE: ICD-10-CM

## 2024-09-03 RX ORDER — SOLIFENACIN SUCCINATE 10 MG/1
10 TABLET, FILM COATED ORAL DAILY
Qty: 90 TABLET | Refills: 1 | Status: SHIPPED | OUTPATIENT
Start: 2024-09-03

## 2024-09-03 NOTE — TELEPHONE ENCOUNTER
Patient called stating that Vesicare has helped and would like to get a refill send to [facility]. Please review and send in if appropriate.

## 2024-09-05 ENCOUNTER — HOSPITAL ENCOUNTER (OUTPATIENT)
Dept: MAMMOGRAPHY | Facility: HOSPITAL | Age: 79
End: 2024-09-05
Attending: INTERNAL MEDICINE
Payer: MEDICARE

## 2024-09-05 DIAGNOSIS — Z12.31 ENCOUNTER FOR SCREENING MAMMOGRAM FOR BREAST CANCER: ICD-10-CM

## 2024-09-05 PROCEDURE — 77063 BREAST TOMOSYNTHESIS BI: CPT

## 2024-09-05 PROCEDURE — 77067 SCR MAMMO BI INCL CAD: CPT

## 2024-10-23 ENCOUNTER — PROCEDURE VISIT (OUTPATIENT)
Dept: OBGYN CLINIC | Facility: CLINIC | Age: 79
End: 2024-10-23
Payer: MEDICARE

## 2024-10-23 VITALS
SYSTOLIC BLOOD PRESSURE: 132 MMHG | BODY MASS INDEX: 24.59 KG/M2 | HEIGHT: 64 IN | DIASTOLIC BLOOD PRESSURE: 76 MMHG | WEIGHT: 144 LBS

## 2024-10-23 DIAGNOSIS — N81.3 PROCIDENTIA OF UTERUS: Primary | ICD-10-CM

## 2024-10-23 PROCEDURE — 99213 OFFICE O/P EST LOW 20 MIN: CPT | Performed by: ADVANCED PRACTICE MIDWIFE

## 2024-10-23 NOTE — PROGRESS NOTES
"Pessary    Date/Time: 10/23/2024 2:00 PM    Performed by: Rebeca Castro CNM  Authorized by: Rebeca Castro CNM  [place] Protocol:  procedure performed by consultantConsent: Verbal consent obtained.  Risks and benefits: risks, benefits and alternatives were discussed  Consent given by: patient  Time out: Immediately prior to procedure a \"time out\" was called to verify the correct patient, procedure, equipment, support staff and site/side marked as required.  Patient understanding: patient states understanding of the procedure being performed  Patient consent: the patient's understanding of the procedure matches consent given  Procedure consent: procedure consent matches procedure scheduled  Relevant documents: relevant documents present and verified  Radiology Images displayed and confirmed. If images not available, report reviewed: imaging studies available  Required items: required blood products, implants, devices, and special equipment available  Patient identity confirmed: verbally with patient    Indication:     Indication for pessary: uterine prolapse    Pre-procedure:     Pessary procedure type:  Cleaning/check  Problems:     Pessary complications: none    Procedure:     Pessary type:  Gellhorn flexible    Patient tolerance of procedure:  Tolerated well, no immediate complications  Comments:     Procedure comments:  States that since she is taking Vesicare she has less frequency and less leaking. Small tear with removal, to continue with estrogen cream 2 times weekly.    "

## 2024-11-18 DIAGNOSIS — I10 PRIMARY HYPERTENSION: ICD-10-CM

## 2024-11-18 RX ORDER — METOPROLOL SUCCINATE 25 MG/1
25 TABLET, EXTENDED RELEASE ORAL DAILY
Qty: 90 TABLET | Refills: 1 | Status: SHIPPED | OUTPATIENT
Start: 2024-11-18

## 2024-11-18 NOTE — TELEPHONE ENCOUNTER
Reason for call:   [x] Refill   [] Prior Auth  [] Other:     Office:   [x] PCP/Provider - Mariela Whitaker MD   [] Specialty/Provider -     Medication:     metoprolol succinate (Toprol XL) 25 mg 24 hr tablet       Dose/Frequency: 25 mg, Oral, Daily     Quantity: 90    Pharmacy: [id number] - [address] - [facility] [address]     Does the patient have enough for 3 days?   [] Yes   [x] No - Send as HP to POD

## 2024-11-19 ENCOUNTER — APPOINTMENT (OUTPATIENT)
Dept: LAB | Facility: CLINIC | Age: 79
End: 2024-11-19
Payer: MEDICARE

## 2024-11-19 ENCOUNTER — OFFICE VISIT (OUTPATIENT)
Dept: FAMILY MEDICINE CLINIC | Facility: CLINIC | Age: 79
End: 2024-11-19
Payer: MEDICARE

## 2024-11-19 VITALS
HEIGHT: 64 IN | RESPIRATION RATE: 16 BRPM | BODY MASS INDEX: 24.52 KG/M2 | TEMPERATURE: 98 F | SYSTOLIC BLOOD PRESSURE: 132 MMHG | HEART RATE: 84 BPM | DIASTOLIC BLOOD PRESSURE: 72 MMHG | WEIGHT: 143.6 LBS | OXYGEN SATURATION: 99 %

## 2024-11-19 DIAGNOSIS — E55.9 VITAMIN D DEFICIENCY: ICD-10-CM

## 2024-11-19 DIAGNOSIS — I10 PRIMARY HYPERTENSION: ICD-10-CM

## 2024-11-19 DIAGNOSIS — F03.A0 MILD DEMENTIA WITHOUT BEHAVIORAL DISTURBANCE, PSYCHOTIC DISTURBANCE, MOOD DISTURBANCE, OR ANXIETY, UNSPECIFIED DEMENTIA TYPE (HCC): ICD-10-CM

## 2024-11-19 DIAGNOSIS — Z00.00 ENCOUNTER FOR ANNUAL WELLNESS VISIT (AWV) IN MEDICARE PATIENT: Primary | ICD-10-CM

## 2024-11-19 DIAGNOSIS — E53.8 B12 DEFICIENCY: ICD-10-CM

## 2024-11-19 DIAGNOSIS — R41.89 COGNITIVE IMPAIRMENT: ICD-10-CM

## 2024-11-19 DIAGNOSIS — D64.9 ANEMIA, UNSPECIFIED TYPE: ICD-10-CM

## 2024-11-19 DIAGNOSIS — N18.31 STAGE 3A CHRONIC KIDNEY DISEASE (HCC): ICD-10-CM

## 2024-11-19 PROBLEM — R41.3 MEMORY LOSS: Status: RESOLVED | Noted: 2021-10-01 | Resolved: 2024-11-19

## 2024-11-19 LAB
25(OH)D3 SERPL-MCNC: 53.2 NG/ML (ref 30–100)
ALBUMIN SERPL BCG-MCNC: 4.5 G/DL (ref 3.5–5)
ALP SERPL-CCNC: 119 U/L (ref 34–104)
ALT SERPL W P-5'-P-CCNC: 11 U/L (ref 7–52)
ANION GAP SERPL CALCULATED.3IONS-SCNC: 10 MMOL/L (ref 4–13)
AST SERPL W P-5'-P-CCNC: 16 U/L (ref 13–39)
BASOPHILS # BLD AUTO: 0.03 THOUSANDS/ÂΜL (ref 0–0.1)
BASOPHILS NFR BLD AUTO: 0 % (ref 0–1)
BILIRUB SERPL-MCNC: 0.73 MG/DL (ref 0.2–1)
BUN SERPL-MCNC: 21 MG/DL (ref 5–25)
CALCIUM SERPL-MCNC: 9.9 MG/DL (ref 8.4–10.2)
CHLORIDE SERPL-SCNC: 102 MMOL/L (ref 96–108)
CO2 SERPL-SCNC: 26 MMOL/L (ref 21–32)
CREAT SERPL-MCNC: 1.23 MG/DL (ref 0.6–1.3)
EOSINOPHIL # BLD AUTO: 0.23 THOUSAND/ÂΜL (ref 0–0.61)
EOSINOPHIL NFR BLD AUTO: 3 % (ref 0–6)
ERYTHROCYTE [DISTWIDTH] IN BLOOD BY AUTOMATED COUNT: 14.9 % (ref 11.6–15.1)
FERRITIN SERPL-MCNC: 21 NG/ML (ref 11–307)
GFR SERPL CREATININE-BSD FRML MDRD: 41 ML/MIN/1.73SQ M
GLUCOSE P FAST SERPL-MCNC: 91 MG/DL (ref 65–99)
HCT VFR BLD AUTO: 40.2 % (ref 34.8–46.1)
HGB BLD-MCNC: 12.7 G/DL (ref 11.5–15.4)
IMM GRANULOCYTES # BLD AUTO: 0.04 THOUSAND/UL (ref 0–0.2)
IMM GRANULOCYTES NFR BLD AUTO: 1 % (ref 0–2)
IRON SATN MFR SERPL: 22 % (ref 15–50)
IRON SERPL-MCNC: 75 UG/DL (ref 50–212)
LYMPHOCYTES # BLD AUTO: 1.29 THOUSANDS/ÂΜL (ref 0.6–4.47)
LYMPHOCYTES NFR BLD AUTO: 17 % (ref 14–44)
MCH RBC QN AUTO: 28.9 PG (ref 26.8–34.3)
MCHC RBC AUTO-ENTMCNC: 31.6 G/DL (ref 31.4–37.4)
MCV RBC AUTO: 91 FL (ref 82–98)
MONOCYTES # BLD AUTO: 0.51 THOUSAND/ÂΜL (ref 0.17–1.22)
MONOCYTES NFR BLD AUTO: 7 % (ref 4–12)
NEUTROPHILS # BLD AUTO: 5.53 THOUSANDS/ÂΜL (ref 1.85–7.62)
NEUTS SEG NFR BLD AUTO: 72 % (ref 43–75)
NRBC BLD AUTO-RTO: 0 /100 WBCS
PLATELET # BLD AUTO: 249 THOUSANDS/UL (ref 149–390)
PMV BLD AUTO: 9.9 FL (ref 8.9–12.7)
POTASSIUM SERPL-SCNC: 4.5 MMOL/L (ref 3.5–5.3)
PROT SERPL-MCNC: 7.9 G/DL (ref 6.4–8.4)
RBC # BLD AUTO: 4.4 MILLION/UL (ref 3.81–5.12)
SODIUM SERPL-SCNC: 138 MMOL/L (ref 135–147)
TIBC SERPL-MCNC: 335 UG/DL (ref 250–450)
UIBC SERPL-MCNC: 260 UG/DL (ref 155–355)
VIT B12 SERPL-MCNC: 523 PG/ML (ref 180–914)
WBC # BLD AUTO: 7.63 THOUSAND/UL (ref 4.31–10.16)

## 2024-11-19 PROCEDURE — 36415 COLL VENOUS BLD VENIPUNCTURE: CPT

## 2024-11-19 PROCEDURE — 82607 VITAMIN B-12: CPT

## 2024-11-19 PROCEDURE — 83550 IRON BINDING TEST: CPT

## 2024-11-19 PROCEDURE — 83540 ASSAY OF IRON: CPT

## 2024-11-19 PROCEDURE — 82728 ASSAY OF FERRITIN: CPT

## 2024-11-19 PROCEDURE — 85025 COMPLETE CBC W/AUTO DIFF WBC: CPT

## 2024-11-19 PROCEDURE — G0439 PPPS, SUBSEQ VISIT: HCPCS | Performed by: INTERNAL MEDICINE

## 2024-11-19 PROCEDURE — 99214 OFFICE O/P EST MOD 30 MIN: CPT | Performed by: INTERNAL MEDICINE

## 2024-11-19 PROCEDURE — 80053 COMPREHEN METABOLIC PANEL: CPT

## 2024-11-19 PROCEDURE — 82306 VITAMIN D 25 HYDROXY: CPT

## 2024-11-19 NOTE — PROGRESS NOTES
Name: Juanita Hernandez      : 1945      MRN: [id number]  Encounter Provider: Mariela Whitaker MD  Encounter Date: 2024   Encounter department: [facility] PRIMARY CARE    Assessment & Plan  Encounter for annual wellness visit (AWV) in Medicare patient         Primary hypertension    Orders:    Comprehensive metabolic panel; Future    Cognitive impairment         Stage 3a chronic kidney disease (HCC)  Lab Results   Component Value Date    EGFR 45 2024    EGFR 42 2023    EGFR 40 2023    CREATININE 1.14 2024    CREATININE 1.22 2023    CREATININE 1.28 2023       Orders:    Comprehensive metabolic panel; Future    Vitamin D 25 hydroxy; Future    CBC and differential; Future    Iron Panel (Includes Ferritin, Iron Sat%, Iron, and TIBC); Future    B12 deficiency    Orders:    Vitamin B12; Future    CBC and differential; Future    Mild dementia without behavioral disturbance, psychotic disturbance, mood disturbance, or anxiety, unspecified dementia type (HCC)    Orders:    Comprehensive metabolic panel; Future    Vitamin B12; Future    Anemia, unspecified type    Orders:    Comprehensive metabolic panel; Future    Vitamin B12; Future    CBC and differential; Future    Iron Panel (Includes Ferritin, Iron Sat%, Iron, and TIBC); Future    Vitamin D deficiency    Orders:    Vitamin D 25 hydroxy; Future      Depression Screening and Follow-up Plan: Patient was screened for depression during today's encounter. They screened negative with a PHQ-2 score of 0.      Preventive health issues were discussed with patient, and age appropriate screening tests were ordered as noted in patient's After Visit Summary. Personalized health advice and appropriate referrals for health education or preventive services given if needed, as noted in patient's After Visit Summary.      History of Present Illness     80yo female with history of HTN, stroke, hypothyroidism, dementia/MCI here  for AWV. Doing well, no major concerns.    Taking vitamin D3 and B12 but unsure of dosages.        Patient Care Team:  Mariela Whitaker MD as PCP - General (Internal Medicine)  Dion Winslow DO (Gastroenterology)    Review of Systems   Constitutional:  Negative for appetite change, chills, fatigue and fever.   HENT:  Positive for hearing loss. Negative for sore throat and trouble swallowing.    Eyes:  Positive for visual disturbance.   Respiratory:  Negative for cough and shortness of breath.    Cardiovascular:  Negative for chest pain, palpitations and leg swelling.   Gastrointestinal:  Negative for abdominal pain, constipation, diarrhea, nausea and vomiting.   Genitourinary:  Negative for difficulty urinating and dysuria.   Neurological:  Negative for dizziness, light-headedness and headaches.   Psychiatric/Behavioral:  Negative for dysphoric mood and sleep disturbance. The patient is not nervous/anxious.      Medical History Reviewed by provider this encounter:  [facility]       Annual Wellness Visit Questionnaire   Juanita is here for her Subsequent Wellness visit.     Health Risk Assessment:   Patient rates overall health as fair. Patient feels that their physical health rating is slightly worse. Patient is very satisfied with their life. Eyesight was rated as slightly worse. Hearing was rated as much worse. Patient feels that their emotional and mental health rating is slightly better. Patients states they are never, rarely angry. Patient states they are sometimes unusually tired/fatigued. Pain experienced in the last 7 days has been some. Patient's pain rating has been 2/10. Patient states that she has experienced weight loss or gain in last 6 months. Has appointment with eye doctor, then plans to have hearing evaluation     Depression Screening:   PHQ-2 Score: 0      Fall Risk Screening:   In the past year, patient has experienced: no history of falling in past year      Urinary Incontinence Screening:    Patient has leaked urine accidently in the last six months.     Home Safety:  Patient does not have trouble with stairs inside or outside of their home. Patient has working smoke alarms and has working carbon monoxide detector. Home safety hazards include: none.     Nutrition:   Current diet is Regular.     Medications:   Patient is currently taking over-the-counter supplements. OTC medications include: see medication list. Patient is able to manage medications.     Activities of Daily Living (ADLs)/Instrumental Activities of Daily Living (IADLs):   Walk and transfer into and out of bed and chair?: Yes  Dress and groom yourself?: Yes    Bathe or shower yourself?: Yes    Feed yourself? Yes  Do your laundry/housekeeping?: Yes  Manage your money, pay your bills and track your expenses?: Yes  Make your own meals?: Yes    Do your own shopping?: Yes    Previous Hospitalizations:   Any hospitalizations or ED visits within the last 12 months?: No      Advance Care Planning:   Living will: Yes    Durable POA for healthcare: Yes    Advanced directive: Yes    Advanced directive counseling given: Yes    Five wishes given: No    Patient declined ACP directive: No      Comments: Son, is POA,     PREVENTIVE SCREENINGS      Cardiovascular Screening:    General: Screening Not Indicated and History Lipid Disorder      Diabetes Screening:     General: Screening Current      Colorectal Cancer Screening:     General: Screening Current      Breast Cancer Screening:     General: Screening Current      Cervical Cancer Screening:    General: Screening Not Indicated      Osteoporosis Screening:    General: Screening Current      Abdominal Aortic Aneurysm (AAA) Screening:        General: Screening Not Indicated      Lung Cancer Screening:     General: Screening Not Indicated      Hepatitis C Screening:    General: Screening Not Indicated    Screening, Brief Intervention, and Referral to Treatment (SBIRT)    Screening  Typical number of  "drinks in a day: 0  Typical number of drinks in a week: 0  Interpretation: Low risk drinking behavior.    Brief Intervention  Alcohol & drug use screenings were reviewed. No concerns regarding substance use disorder identified.     Other Counseling Topics:   Skin self-exam, sunscreen and calcium and vitamin D intake and regular weightbearing exercise. Pt hasn't driven in 3 years  Taking vitamin D but does not dose       Social Drivers of Health     Financial Resource Strain: Low Risk  (11/7/2022)    Overall Financial Resource Strain (CARDIA)     Difficulty of Paying Living Expenses: Not hard at all   Food Insecurity: No Food Insecurity (11/19/2024)    Hunger Vital Sign     Worried About Running Out of Food in the Last Year: Never true     Ran Out of Food in the Last Year: Never true   Transportation Needs: No Transportation Needs (11/19/2024)    PRAPARE - Transportation     Lack of Transportation (Medical): No     Lack of Transportation (Non-Medical): No   Housing Stability: Low Risk  (11/19/2024)    Housing Stability Vital Sign     Unable to Pay for Housing in the Last Year: No     Number of Times Moved in the Last Year: 0     Homeless in the Last Year: No   Utilities: Not At Risk (11/19/2024)    [facility] Utilities     Threatened with loss of utilities: No     No results found.    Objective   /72   Pulse 84   Temp 98 °F (36.7 °C) (Temporal)   Resp 16   Ht 5' 4\" (1.626 m)   Wt 65.1 kg (143 lb 9.6 oz)   LMP  (LMP Unknown)   SpO2 99%   BMI 24.65 kg/m²     Physical Exam  Vitals reviewed.   Constitutional:       General: She is not in acute distress.     Appearance: She is normal weight.   HENT:      Head: Normocephalic and atraumatic.      Right Ear: There is impacted cerumen.      Left Ear: There is impacted cerumen.      Mouth/Throat:      Pharynx: No oropharyngeal exudate or posterior oropharyngeal erythema.   Cardiovascular:      Rate and Rhythm: Normal rate and regular rhythm.      Heart sounds: No murmur " heard.     No friction rub. No gallop.   Pulmonary:      Effort: Pulmonary effort is normal. No respiratory distress.      Breath sounds: No wheezing, rhonchi or rales.   Abdominal:      General: Abdomen is flat. Bowel sounds are normal. There is no distension.      Palpations: Abdomen is soft.      Tenderness: There is no abdominal tenderness. There is no guarding or rebound.   Lymphadenopathy:      Cervical: No cervical adenopathy.   Neurological:      General: No focal deficit present.      Mental Status: She is alert.      Motor: No weakness.      Gait: Gait normal.   Psychiatric:         Mood and Affect: Mood normal.         Behavior: Behavior normal.

## 2024-11-19 NOTE — ASSESSMENT & PLAN NOTE
Lab Results   Component Value Date    EGFR 45 08/19/2024    EGFR 42 11/16/2023    EGFR 40 05/11/2023    CREATININE 1.14 08/19/2024    CREATININE 1.22 11/16/2023    CREATININE 1.28 05/11/2023       Orders:    Comprehensive metabolic panel; Future    Vitamin D 25 hydroxy; Future    CBC and differential; Future    Iron Panel (Includes Ferritin, Iron Sat%, Iron, and TIBC); Future

## 2024-11-19 NOTE — PATIENT INSTRUCTIONS
Medicare Preventive Visit Patient Instructions  Thank you for completing your Welcome to Medicare Visit or Medicare Annual Wellness Visit today. Your next wellness visit will be due in one year (11/20/2025).  The screening/preventive services that you may require over the next 5-10 years are detailed below. Some tests may not apply to you based off risk factors and/or age. Screening tests ordered at today's visit but not completed yet may show as past due. Also, please note that scanned in results may not display below.  Preventive Screenings:  Service Recommendations Previous Testing/Comments   Colorectal Cancer Screening  * Colonoscopy    * Fecal Occult Blood Test (FOBT)/Fecal Immunochemical Test (FIT)  * Fecal DNA/Cologuard Test  * Flexible Sigmoidoscopy Age: 45-75 years old   Colonoscopy: every 10 years (may be performed more frequently if at higher risk)  OR  FOBT/FIT: every 1 year  OR  Cologuard: every 3 years  OR  Sigmoidoscopy: every 5 years  Screening may be recommended earlier than age 45 if at higher risk for colorectal cancer. Also, an individualized decision between you and your healthcare provider will decide whether screening between the ages of 76-85 would be appropriate. Colonoscopy: 10/10/2023  FOBT/FIT: Not on file  Cologuard: Not on file  Sigmoidoscopy: Not on file    Screening Current     Breast Cancer Screening Age: 40+ years old  Frequency: every 1-2 years  Not required if history of left and right mastectomy Mammogram: 09/05/2024    Screening Current   Cervical Cancer Screening Between the ages of 21-29, pap smear recommended once every 3 years.   Between the ages of 30-65, can perform pap smear with HPV co-testing every 5 years.   Recommendations may differ for women with a history of total hysterectomy, cervical cancer, or abnormal pap smears in past. Pap Smear: 05/09/2022    Screening Not Indicated   Hepatitis C Screening Once for adults born between 1945 and 1965  More frequently in  patients at high risk for Hepatitis C Hep C Antibody: Not on file        Diabetes Screening 1-2 times per year if you're at risk for diabetes or have pre-diabetes Fasting glucose: 105 mg/dL (8/19/2024)  A1C: 5.3 % (11/2/2022)  Screening Current   Cholesterol Screening Once every 5 years if you don't have a lipid disorder. May order more often based on risk factors. Lipid panel: 08/19/2024    Screening Not Indicated  History Lipid Disorder     Other Preventive Screenings Covered by Medicare:  Abdominal Aortic Aneurysm (AAA) Screening: covered once if your at risk. You're considered to be at risk if you have a family history of AAA.  Lung Cancer Screening: covers low dose CT scan once per year if you meet all of the following conditions: (1) Age 55-77; (2) No signs or symptoms of lung cancer; (3) Current smoker or have quit smoking within the last 15 years; (4) You have a tobacco smoking history of at least 20 pack years (packs per day multiplied by number of years you smoked); (5) You get a written order from a healthcare provider.  Glaucoma Screening: covered annually if you're considered high risk: (1) You have diabetes OR (2) Family history of glaucoma OR (3)  aged 50 and older OR (4)  American aged 65 and older  Osteoporosis Screening: covered every 2 years if you meet one of the following conditions: (1) You're estrogen deficient and at risk for osteoporosis based off medical history and other findings; (2) Have a vertebral abnormality; (3) On glucocorticoid therapy for more than 3 months; (4) Have primary hyperparathyroidism; (5) On osteoporosis medications and need to assess response to drug therapy.   Last bone density test (DXA Scan): 05/25/2022.  HIV Screening: covered annually if you're between the age of 15-65. Also covered annually if you are younger than 15 and older than 65 with risk factors for HIV infection. For pregnant patients, it is covered up to 3 times per  pregnancy.    Immunizations:  Immunization Recommendations   Influenza Vaccine Annual influenza vaccination during flu season is recommended for all persons aged >= 6 months who do not have contraindications   Pneumococcal Vaccine   * Pneumococcal conjugate vaccine = PCV13 (Prevnar 13), PCV15 (Vaxneuvance), PCV20 (Prevnar 20)  * Pneumococcal polysaccharide vaccine = PPSV23 (Pneumovax) Adults 19-65 yo with certain risk factors or if 65+ yo  If never received any pneumonia vaccine: recommend Prevnar 20 (PCV20)  Give PCV20 if previously received 1 dose of PCV13 or PPSV23   Hepatitis B Vaccine 3 dose series if at intermediate or high risk (ex: diabetes, end stage renal disease, liver disease)   Respiratory syncytial virus (RSV) Vaccine - COVERED BY MEDICARE PART D  * RSVPreF3 (Arexvy) CDC recommends that adults 60 years of age and older may receive a single dose of RSV vaccine using shared clinical decision-making (SCDM)   Tetanus (Td) Vaccine - COST NOT COVERED BY MEDICARE PART B Following completion of primary series, a booster dose should be given every 10 years to maintain immunity against tetanus. Td may also be given as tetanus wound prophylaxis.   Tdap Vaccine - COST NOT COVERED BY MEDICARE PART B Recommended at least once for all adults. For pregnant patients, recommended with each pregnancy.   Shingles Vaccine (Shingrix) - COST NOT COVERED BY MEDICARE PART B  2 shot series recommended in those 19 years and older who have or will have weakened immune systems or those 50 years and older     Health Maintenance Due:      Topic Date Due   • Lung Cancer Screening  11/22/2023   • Colorectal Cancer Screening  10/09/2026     Immunizations Due:      Topic Date Due   • Pneumococcal Vaccine: 65+ Years (1 of 1 - PCV) Never done   • COVID-19 Vaccine (4 - 2024-25 season) 09/01/2024     Advance Directives   What are advance directives?  Advance directives are legal documents that state your wishes and plans for medical care.  These plans are made ahead of time in case you lose your ability to make decisions for yourself. Advance directives can apply to any medical decision, such as the treatments you want, and if you want to donate organs.   What are the types of advance directives?  There are many types of advance directives, and each state has rules about how to use them. You may choose a combination of any of the following:  Living will:  This is a written record of the treatment you want. You can also choose which treatments you do not want, which to limit, and which to stop at a certain time. This includes surgery, medicine, IV fluid, and tube feedings.   Durable power of  for healthcare (DPAHC):  This is a written record that states who you want to make healthcare choices for you when you are unable to make them for yourself. This person, called a proxy, is usually a family member or a friend. You may choose more than 1 proxy.  Do not resuscitate (DNR) order:  A DNR order is used in case your heart stops beating or you stop breathing. It is a request not to have certain forms of treatment, such as CPR. A DNR order may be included in other types of advance directives.  Medical directive:  This covers the care that you want if you are in a coma, near death, or unable to make decisions for yourself. You can list the treatments you want for each condition. Treatment may include pain medicine, surgery, blood transfusions, dialysis, IV or tube feedings, and a ventilator (breathing machine).  Values history:  This document has questions about your views, beliefs, and how you feel and think about life. This information can help others choose the care that you would choose.  Why are advance directives important?  An advance directive helps you control your care. Although spoken wishes may be used, it is better to have your wishes written down. Spoken wishes can be misunderstood, or not followed. Treatments may be given even if you  do not want them. An advance directive may make it easier for your family to make difficult choices about your care.   Urinary Incontinence   Urinary incontinence (UI)  is when you lose control of your bladder. UI develops because your bladder cannot store or empty urine properly. The 3 most common types of UI are stress incontinence, urge incontinence, or both.  Medicines:   May be given to help strengthen your bladder control. Report any side effects of medication to your healthcare provider.  Do pelvic muscle exercises often:  Your pelvic muscles help you stop urinating. Squeeze these muscles tight for 5 seconds, then relax for 5 seconds. Gradually work up to squeezing for 10 seconds. Do 3 sets of 15 repetitions a day, or as directed. This will help strengthen your pelvic muscles and improve bladder control.  Train your bladder:  Go to the bathroom at set times, such as every 2 hours, even if you do not feel the urge to go. You can also try to hold your urine when you feel the urge to go. For example, hold your urine for 5 minutes when you feel the urge to go. As that becomes easier, hold your urine for 10 minutes.   Self-care:   Keep a UI record.  Write down how often you leak urine and how much you leak. Make a note of what you were doing when you leaked urine.  Drink liquids as directed. You may need to limit the amount of liquid you drink to help control your urine leakage. Do not drink any liquid right before you go to bed. Limit or do not have drinks that contain caffeine or alcohol.   Prevent constipation.  Eat a variety of high-fiber foods. Good examples are high-fiber cereals, beans, vegetables, and whole-grain breads. Walking is the best way to trigger your intestines to have a bowel movement.  Exercise regularly and maintain a healthy weight.  Weight loss and exercise will decrease pressure on your bladder and help you control your leakage.   Use a catheter as directed  to help empty your bladder. A  catheter is a tiny, plastic tube that is put into your bladder to drain your urine.   Go to behavior therapy as directed.  Behavior therapy may be used to help you learn to control your urge to urinate.     © Copyright [employer] 2018 Information is for End User's use only and may not be sold, redistributed or otherwise used for commercial purposes. All illustrations and images included in CareNotes® are the copyrighted property of A.D.A.M., Inc. or [employer]

## 2024-11-21 ENCOUNTER — RESULTS FOLLOW-UP (OUTPATIENT)
Dept: FAMILY MEDICINE CLINIC | Facility: CLINIC | Age: 79
End: 2024-11-21

## 2024-11-22 ENCOUNTER — TELEPHONE (OUTPATIENT)
Dept: OBGYN CLINIC | Facility: CLINIC | Age: 79
End: 2024-11-22

## 2024-11-22 NOTE — TELEPHONE ENCOUNTER
Called and made patient aware she still has a referral for Urogynecology that will  2025. Patient stated she will call and schedule.

## 2024-11-27 NOTE — PATIENT INSTRUCTIONS
Please get blood work done this week   Recommend trying melatonin 1mg at bedtime to help with sleep  Please try Vesicare for urinary incontinence, also follow up with Dr. Heredia    
[facility] provides services at a reduced cost to those who are determined to be eligible through [facility]’s financial assistance program. Information regarding [facility]’s financial assistance program can be found by going to [email or web address] or by calling [phone].

## 2025-01-17 DIAGNOSIS — E03.8 OTHER SPECIFIED HYPOTHYROIDISM: ICD-10-CM

## 2025-01-17 RX ORDER — LEVOTHYROXINE SODIUM 88 UG/1
88 TABLET ORAL
Qty: 90 TABLET | Refills: 1 | Status: SHIPPED | OUTPATIENT
Start: 2025-01-17

## 2025-01-17 NOTE — TELEPHONE ENCOUNTER
Reason for call:   [x] Refill   [] Prior Auth  [] Other:     Office:   [x] PCP/Provider -   [] Specialty/Provider -     Medication: levothyroxine 88 mcg 1 tablet daily       [facility]: [facility]     Does the patient have enough for 3 days?   [] Yes   [x] No - Send as HP to POD

## 2025-01-22 ENCOUNTER — TELEPHONE (OUTPATIENT)
Dept: FAMILY MEDICINE CLINIC | Facility: CLINIC | Age: 80
End: 2025-01-22

## 2025-01-22 ENCOUNTER — TELEPHONE (OUTPATIENT)
Age: 80
End: 2025-01-22

## 2025-01-22 NOTE — TELEPHONE ENCOUNTER
Pt called to schedule a pre-op clearance appointment. Pt states she is having cataract surgery and the right eye is to be done on 2/14/25 and the left on 3/5/25.  No appointments available with any provider within that time frame and a warm transfer to office clerical was done for further assistance.

## 2025-01-24 NOTE — TELEPHONE ENCOUNTER
Spoke with patient, offered to schedule with her PCP but patient said she has to keep the appointment she has already made due to her issues with getting transportation / a ride.

## 2025-01-29 NOTE — TELEPHONE ENCOUNTER
Called and left msg for patient. Needs to reschedule appt with Binta for Friday. Needs to be scheduled after 2/5 in order to be a preop clearance for both eyes. Friday's appt is not 30 days within 2nd surgery. Please put on PCP's schedule per previous msg for 20mins.

## 2025-02-10 ENCOUNTER — TELEPHONE (OUTPATIENT)
Age: 80
End: 2025-02-10

## 2025-02-10 ENCOUNTER — TELEPHONE (OUTPATIENT)
Dept: ADMINISTRATIVE | Facility: OTHER | Age: 80
End: 2025-02-10

## 2025-02-10 ENCOUNTER — CONSULT (OUTPATIENT)
Dept: FAMILY MEDICINE CLINIC | Facility: CLINIC | Age: 80
End: 2025-02-10
Payer: MEDICARE

## 2025-02-10 VITALS
HEART RATE: 73 BPM | SYSTOLIC BLOOD PRESSURE: 134 MMHG | HEIGHT: 64 IN | WEIGHT: 143 LBS | DIASTOLIC BLOOD PRESSURE: 64 MMHG | TEMPERATURE: 97.7 F | OXYGEN SATURATION: 99 % | RESPIRATION RATE: 18 BRPM | BODY MASS INDEX: 24.41 KG/M2

## 2025-02-10 DIAGNOSIS — F03.A0 MILD DEMENTIA WITHOUT BEHAVIORAL DISTURBANCE, PSYCHOTIC DISTURBANCE, MOOD DISTURBANCE, OR ANXIETY, UNSPECIFIED DEMENTIA TYPE (HCC): ICD-10-CM

## 2025-02-10 DIAGNOSIS — N18.31 STAGE 3A CHRONIC KIDNEY DISEASE (HCC): ICD-10-CM

## 2025-02-10 DIAGNOSIS — H25.13 AGE-RELATED NUCLEAR CATARACT OF BOTH EYES: ICD-10-CM

## 2025-02-10 DIAGNOSIS — Z01.818 PREOP EXAMINATION: Primary | ICD-10-CM

## 2025-02-10 PROCEDURE — G2211 COMPLEX E/M VISIT ADD ON: HCPCS | Performed by: INTERNAL MEDICINE

## 2025-02-10 PROCEDURE — 99213 OFFICE O/P EST LOW 20 MIN: CPT | Performed by: INTERNAL MEDICINE

## 2025-02-10 NOTE — TELEPHONE ENCOUNTER
Patient's son called because the patient told him that the office asked for a copy of her Living Will, please call the son back and let him know, thanks.

## 2025-02-10 NOTE — ASSESSMENT & PLAN NOTE
Lab Results   Component Value Date    EGFR 41 11/19/2024    EGFR 45 08/19/2024    EGFR 42 11/16/2023    CREATININE 1.23 11/19/2024    CREATININE 1.14 08/19/2024    CREATININE 1.22 11/16/2023

## 2025-02-10 NOTE — PROGRESS NOTES
"Name: Juanita Hernandez      : 1945      MRN: [id number]  Encounter Provider: Mariela Whitaker MD  Encounter Date: 2/10/2025   Encounter department: [facility]  :  Assessment & Plan  Preop examination  - low risk procedure, okay to proceed as planned.        Age-related nuclear cataract of both eyes         Mild dementia without behavioral disturbance, psychotic disturbance, mood disturbance, or anxiety, unspecified dementia type (HCC)         Stage 3a chronic kidney disease (HCC)  Lab Results   Component Value Date    EGFR 41 2024    EGFR 45 2024    EGFR 42 2023    CREATININE 1.23 2024    CREATININE 1.14 2024    CREATININE 1.22 2023                 History of Present Illness   80yo female with history of HTN, CVA, hypothyroidism here for evaluation prior to upcoming cataract surgery. Pt will be having both eyes done within the month. Was told she did not need to stop ASA or other meds. Anesthesia will be topical/MAC.       Review of Systems   Constitutional:  Negative for appetite change, chills, fatigue and fever.   HENT:  Negative for congestion, rhinorrhea and sore throat.    Respiratory:  Negative for cough and shortness of breath.    Cardiovascular:  Negative for chest pain, palpitations and leg swelling.   Gastrointestinal:  Negative for abdominal pain, constipation, diarrhea, nausea and vomiting.   Genitourinary:  Negative for difficulty urinating.   Neurological:  Negative for dizziness, light-headedness and headaches.       Objective   /64   Pulse 73   Temp 97.7 °F (36.5 °C)   Resp 18   Ht 5' 4\" (1.626 m)   Wt 64.9 kg (143 lb)   LMP  (LMP Unknown)   SpO2 99%   BMI 24.55 kg/m²      Physical Exam  Vitals reviewed.   Constitutional:       General: She is not in acute distress.     Appearance: She is normal weight.   HENT:      Head: Normocephalic and atraumatic.      Mouth/Throat:      Pharynx: No posterior oropharyngeal " erythema.   Cardiovascular:      Rate and Rhythm: Normal rate and regular rhythm.      Heart sounds: No murmur heard.     No friction rub. No gallop.   Pulmonary:      Effort: Pulmonary effort is normal. No respiratory distress.      Breath sounds: No wheezing, rhonchi or rales.   Musculoskeletal:      Right lower leg: No edema.      Left lower leg: No edema.   Lymphadenopathy:      Cervical: No cervical adenopathy.   Neurological:      General: No focal deficit present.      Mental Status: She is alert.      Motor: Motor function is intact.      Gait: Gait is intact.   Psychiatric:         Mood and Affect: Mood and affect normal.         Behavior: Behavior normal. Behavior is cooperative.

## 2025-02-10 NOTE — TELEPHONE ENCOUNTER
02/10/25 10:17 AM    Patient contacted to bring Advance Directive, POLST, or Living Will document to next scheduled pcp visit.[facility] Department spoke with patient/ caregiver.    Thank you.  Daly Buckley MA  [facility]

## 2025-02-17 NOTE — TELEPHONE ENCOUNTER
Called and spoke with son Louie, he will fax over her documents to our office sometime this   week.

## 2025-02-24 ENCOUNTER — OFFICE VISIT (OUTPATIENT)
Dept: OBGYN CLINIC | Facility: CLINIC | Age: 80
End: 2025-02-24
Payer: MEDICARE

## 2025-02-24 VITALS
HEIGHT: 64 IN | DIASTOLIC BLOOD PRESSURE: 64 MMHG | SYSTOLIC BLOOD PRESSURE: 112 MMHG | WEIGHT: 144 LBS | BODY MASS INDEX: 24.59 KG/M2

## 2025-02-24 DIAGNOSIS — Z46.89 PESSARY MAINTENANCE: Primary | ICD-10-CM

## 2025-02-24 DIAGNOSIS — I10 PRIMARY HYPERTENSION: ICD-10-CM

## 2025-02-24 DIAGNOSIS — N81.3 PROCIDENTIA OF UTERUS: ICD-10-CM

## 2025-02-24 PROCEDURE — A4562 PESSARY, NON RUBBER,ANY TYPE: HCPCS | Performed by: ADVANCED PRACTICE MIDWIFE

## 2025-02-24 PROCEDURE — 57160 INSERT PESSARY/OTHER DEVICE: CPT | Performed by: ADVANCED PRACTICE MIDWIFE

## 2025-02-24 PROCEDURE — 99213 OFFICE O/P EST LOW 20 MIN: CPT | Performed by: ADVANCED PRACTICE MIDWIFE

## 2025-02-24 RX ORDER — AMLODIPINE BESYLATE 10 MG/1
10 TABLET ORAL DAILY
Qty: 90 TABLET | Refills: 3 | Status: SHIPPED | OUTPATIENT
Start: 2025-02-24

## 2025-02-24 NOTE — PROGRESS NOTES
"Pessary    Date/Time: 2/24/2025 11:30 AM    Performed by: Rebeca Castro CNM  Authorized by: Rebeca Castro CNM  [place] Protocol:  procedure performed by consultantConsent: Verbal consent obtained.  Risks and benefits: risks, benefits and alternatives were discussed  Consent given by: patient  Time out: Immediately prior to procedure a \"time out\" was called to verify the correct patient, procedure, equipment, support staff and site/side marked as required.  Patient understanding: patient states understanding of the procedure being performed  Patient consent: the patient's understanding of the procedure matches consent given  Procedure consent: procedure consent matches procedure scheduled  Relevant documents: relevant documents present and verified  Required items: required blood products, implants, devices, and special equipment available  Patient identity confirmed: verbally with patient    Indication:     Indication for pessary: uterine prolapse    Pre-procedure:     Pessary procedure type:  Resize  Problems:     Pessary complications: none    Procedure:     Pessary type:  Gellhorn flexible    Pessary size:  4    Patient tolerance of procedure:  Tolerated well, no immediate complications  Comments:     Procedure comments:  Noted tearing at vaginal opening with removal of pessary.  Increasing difficulty removing.  Will change to #4 Gellhorn  Patient denies discomfort.  Position of pessary,unchanged with Valsalva  To apply Vaseline at vaginal opening.  Aware that she could have some minor blood on pad.  To call if notes slippage of pessary or increase in urinary incontinence.      "

## 2025-02-24 NOTE — TELEPHONE ENCOUNTER
Reason for call:   [x] Refill   [] Prior Auth  [] Other:     Office:   [x] PCP/Provider - Mariela Whitaker  [] Specialty/Provider -     Medication: Amlodipine    Dose/Frequency: 10 mg Daily     Quantity: 90    [facility]: [facility]     Does the patient have enough for 3 days?   [x] Yes   [] No - Send as HP to POD

## 2025-03-07 DIAGNOSIS — N39.46 MIXED STRESS AND URGE URINARY INCONTINENCE: ICD-10-CM

## 2025-03-07 RX ORDER — SOLIFENACIN SUCCINATE 10 MG/1
10 TABLET, FILM COATED ORAL DAILY
Qty: 90 TABLET | Refills: 1 | Status: SHIPPED | OUTPATIENT
Start: 2025-03-07

## 2025-03-07 NOTE — TELEPHONE ENCOUNTER
Reason for call:   [x] Refill   [] Prior Auth  [] Other:     Office:   [x] PCP/Provider - Mariela Whitaker MD   [] Specialty/Provider -     Medication: solifenacin (VESICARE) 10 MG tablet     Dose/Frequency: Take 1 tablet (10 mg total) by mouth daily     Quantity: 90    Pharmacy: [id number]  [place] [facility] [address]      Local Pharmacy   Does the patient have enough for 3 days?   [x] Yes   [] No - Send as HP to POD    Mail Away Pharmacy   Does the patient have enough for 10 days?   [] Yes   [] No - Send as HP to POD

## 2025-04-08 DIAGNOSIS — I63.9 CEREBROVASCULAR ACCIDENT (CVA), UNSPECIFIED MECHANISM (HCC): ICD-10-CM

## 2025-04-08 DIAGNOSIS — E78.00 ELEVATED CHOLESTEROL: ICD-10-CM

## 2025-04-08 NOTE — TELEPHONE ENCOUNTER
Reason for call:   [x] Refill   [] Prior Auth  [] Other:     Office:   [x] PCP/Provider -   [] Specialty/Provider -     Medication:     atorvastatin (LIPITOR) 40 mg Take 1 tablet (40 mg total) by mouth every evening          Pharmacy:  [id number] - [place]     Local Pharmacy   Does the patient have enough for 3 days?   [x] Yes   [] No - Send as HP to POD    Mail Away Pharmacy   Does the patient have enough for 10 days?   [] Yes   [] No - Send as HP to POD

## 2025-04-09 RX ORDER — ATORVASTATIN CALCIUM 40 MG/1
40 TABLET, FILM COATED ORAL EVERY EVENING
Qty: 90 TABLET | Refills: 1 | Status: SHIPPED | OUTPATIENT
Start: 2025-04-09

## 2025-04-24 DIAGNOSIS — I10 PRIMARY HYPERTENSION: ICD-10-CM

## 2025-04-24 RX ORDER — METOPROLOL SUCCINATE 25 MG/1
25 TABLET, EXTENDED RELEASE ORAL DAILY
Qty: 90 TABLET | Refills: 1 | Status: SHIPPED | OUTPATIENT
Start: 2025-04-24

## 2025-04-24 NOTE — TELEPHONE ENCOUNTER
Reason for call:   [x] Refill   [] Prior Auth  [] Other:     Office:   [x] PCP/Provider - [facility] - Mariela Whitaker MD   [] Specialty/Provider -     Medication:   metoprolol succinate (Toprol XL) 25 mg 24 hr tablet    Dose/Frequency: Take 1 tablet (25 mg total) by mouth daily     Quantity: 90 tablet     Pharmacy: [id number] [place] [address] [address] [phone]    Local Pharmacy   Does the patient have enough for 3 days?   [] Yes   [] No - Send as HP to POD    Mail Away Pharmacy   Does the patient have enough for 10 days?   [] Yes   [] No - Send as HP to POD

## 2025-05-20 ENCOUNTER — TELEPHONE (OUTPATIENT)
Dept: ADMINISTRATIVE | Facility: OTHER | Age: 80
End: 2025-05-20

## 2025-05-20 ENCOUNTER — APPOINTMENT (OUTPATIENT)
Dept: LAB | Facility: CLINIC | Age: 80
End: 2025-05-20
Attending: INTERNAL MEDICINE
Payer: MEDICARE

## 2025-05-20 ENCOUNTER — OFFICE VISIT (OUTPATIENT)
Dept: FAMILY MEDICINE CLINIC | Facility: CLINIC | Age: 80
End: 2025-05-20
Payer: MEDICARE

## 2025-05-20 VITALS
BODY MASS INDEX: 24.89 KG/M2 | HEIGHT: 64 IN | OXYGEN SATURATION: 99 % | RESPIRATION RATE: 16 BRPM | TEMPERATURE: 97.6 F | WEIGHT: 145.8 LBS | SYSTOLIC BLOOD PRESSURE: 128 MMHG | HEART RATE: 56 BPM | DIASTOLIC BLOOD PRESSURE: 68 MMHG

## 2025-05-20 DIAGNOSIS — I10 PRIMARY HYPERTENSION: ICD-10-CM

## 2025-05-20 DIAGNOSIS — I63.9 CEREBROVASCULAR ACCIDENT (CVA), UNSPECIFIED MECHANISM (HCC): ICD-10-CM

## 2025-05-20 DIAGNOSIS — I65.23 BILATERAL CAROTID ARTERY STENOSIS: ICD-10-CM

## 2025-05-20 DIAGNOSIS — N18.31 STAGE 3A CHRONIC KIDNEY DISEASE (HCC): ICD-10-CM

## 2025-05-20 DIAGNOSIS — E03.8 OTHER SPECIFIED HYPOTHYROIDISM: ICD-10-CM

## 2025-05-20 DIAGNOSIS — E55.9 VITAMIN D DEFICIENCY: ICD-10-CM

## 2025-05-20 DIAGNOSIS — I10 PRIMARY HYPERTENSION: Primary | ICD-10-CM

## 2025-05-20 PROCEDURE — 84156 ASSAY OF PROTEIN URINE: CPT

## 2025-05-20 PROCEDURE — 85025 COMPLETE CBC W/AUTO DIFF WBC: CPT

## 2025-05-20 PROCEDURE — 84443 ASSAY THYROID STIM HORMONE: CPT

## 2025-05-20 PROCEDURE — 81001 URINALYSIS AUTO W/SCOPE: CPT

## 2025-05-20 PROCEDURE — G2211 COMPLEX E/M VISIT ADD ON: HCPCS | Performed by: INTERNAL MEDICINE

## 2025-05-20 PROCEDURE — 80061 LIPID PANEL: CPT

## 2025-05-20 PROCEDURE — 99214 OFFICE O/P EST MOD 30 MIN: CPT | Performed by: INTERNAL MEDICINE

## 2025-05-20 PROCEDURE — 82043 UR ALBUMIN QUANTITATIVE: CPT

## 2025-05-20 PROCEDURE — 36415 COLL VENOUS BLD VENIPUNCTURE: CPT

## 2025-05-20 PROCEDURE — 82306 VITAMIN D 25 HYDROXY: CPT

## 2025-05-20 PROCEDURE — 80053 COMPREHEN METABOLIC PANEL: CPT

## 2025-05-20 PROCEDURE — 82570 ASSAY OF URINE CREATININE: CPT

## 2025-05-20 NOTE — ASSESSMENT & PLAN NOTE
-history of left-sided temporal and occipital infarcts in 2021. Bp controlled, on ASA, statin. Due for updated labs, carotid duplex. Discussed with pt and friend Nakia  Orders:  •  Lipid Panel with Direct LDL reflex; Future  •  Comprehensive metabolic panel; Future  •  VAS carotid complete study; Future

## 2025-05-20 NOTE — ASSESSMENT & PLAN NOTE
Lab Results   Component Value Date    EGFR 41 11/19/2024    EGFR 45 08/19/2024    EGFR 42 11/16/2023    CREATININE 1.23 11/19/2024    CREATININE 1.14 08/19/2024    CREATININE 1.22 11/16/2023       Orders:  •  CBC and differential; Future  •  Lipid Panel with Direct LDL reflex; Future  •  Comprehensive metabolic panel; Future  •  TSH, 3rd generation with Free T4 reflex; Future  •  Vitamin D 25 hydroxy; Future  •  Urinalysis with microscopic; Future  •  Protein / creatinine ratio, urine; Future  •  Albumin / creatinine urine ratio; Future  •  VAS carotid complete study; Future

## 2025-05-20 NOTE — ASSESSMENT & PLAN NOTE
-Bp controlled with amlodipine, metoprolol   Orders:  •  CBC and differential; Future  •  Lipid Panel with Direct LDL reflex; Future  •  Comprehensive metabolic panel; Future  •  Vitamin D 25 hydroxy; Future  •  Urinalysis with microscopic; Future  •  Protein / creatinine ratio, urine; Future  •  Albumin / creatinine urine ratio; Future

## 2025-05-20 NOTE — TELEPHONE ENCOUNTER
----- Message from Nia PARDO sent at 5/20/2025 11:56 AM EDT -----  Regarding: Care Gap Request  05/20/25 11:56 Kala, our patient above has had eye surgery completed/performed. Please assist in updating the patient chart by making an External outreach to [facility] eye facility located in [place]. Their number is [phone].Thank you,KALA [facility]

## 2025-05-20 NOTE — PROGRESS NOTES
Name: Juanita Hernandez      : 1945      MRN: [id number]  Encounter Provider: Mariela Whitaker MD  Encounter Date: 2025   Encounter department: [facility] PRIMARY CARE  :  Assessment & Plan  Primary hypertension  -Bp controlled with amlodipine, metoprolol   Orders:  •  CBC and differential; Future  •  Lipid Panel with Direct LDL reflex; Future  •  Comprehensive metabolic panel; Future  •  Vitamin D 25 hydroxy; Future  •  Urinalysis with microscopic; Future  •  Protein / creatinine ratio, urine; Future  •  Albumin / creatinine urine ratio; Future    Cerebrovascular accident (CVA), unspecified mechanism (HCC)  -history of left-sided temporal and occipital infarcts in . Bp controlled, on ASA, statin. Due for updated labs, carotid duplex. Discussed with pt and friend Nakia  Orders:  •  Lipid Panel with Direct LDL reflex; Future  •  Comprehensive metabolic panel; Future  •  VAS carotid complete study; Future    Stage 3a chronic kidney disease (HCC)  Lab Results   Component Value Date    EGFR 41 2024    EGFR 45 2024    EGFR 42 2023    CREATININE 1.23 2024    CREATININE 1.14 2024    CREATININE 1.22 2023       Orders:  •  CBC and differential; Future  •  Lipid Panel with Direct LDL reflex; Future  •  Comprehensive metabolic panel; Future  •  TSH, 3rd generation with Free T4 reflex; Future  •  Vitamin D 25 hydroxy; Future  •  Urinalysis with microscopic; Future  •  Protein / creatinine ratio, urine; Future  •  Albumin / creatinine urine ratio; Future  •  VAS carotid complete study; Future    Other specified hypothyroidism    Orders:  •  TSH, 3rd generation with Free T4 reflex; Future    Vitamin D deficiency    Orders:  •  Comprehensive metabolic panel; Future  •  Vitamin D 25 hydroxy; Future    Bilateral carotid artery stenosis  < 50% stenosis bilaterally on duplex in , due for repeat imaging for surveillance     Orders:  •  VAS carotid complete study;  "Future           History of Present Illness   80yo female with history of HTN, hypothyroidism, CVA, CKD stage 3 here for 6 month follow up. Doing well, no major concerns today. 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     Concerned about eye lid lesion on right lower lid.       Review of Systems   Constitutional:  Negative for appetite change, chills, fatigue and fever.   Eyes:  Positive for visual disturbance.   Respiratory:  Negative for chest tightness and shortness of breath.    Cardiovascular:  Negative for chest pain.   Genitourinary:  Negative for difficulty urinating.   Neurological:  Negative for dizziness, light-headedness and headaches.       Objective   /68   Pulse 56   Temp 97.6 °F (36.4 °C) (Temporal)   Resp 16   Ht 5' 4\" (1.626 m)   Wt 66.1 kg (145 lb 12.8 oz)   LMP  (LMP Unknown)   SpO2 99%   BMI 25.03 kg/m²      Physical Exam  Vitals reviewed.   Constitutional:       General: She is not in acute distress.     Appearance: She is normal weight.   Neck:      Vascular: Carotid bruit present.     Cardiovascular:      Rate and Rhythm: Normal rate and regular rhythm.      Heart sounds: No murmur heard.     No friction rub. No gallop.   Pulmonary:      Effort: Pulmonary effort is normal. No respiratory distress.      Breath sounds: No wheezing, rhonchi or rales.     Musculoskeletal:      Right lower leg: Edema (trace pitting at ankle) present.      Left lower leg: Edema present.     Neurological:      Mental Status: She is alert.      Motor: Motor function is intact.      Gait: Gait is intact.     Psychiatric:         Mood and Affect: Mood and affect normal.         Behavior: Behavior normal. Behavior is cooperative.         "

## 2025-05-20 NOTE — PATIENT INSTRUCTIONS
Please get blood work done today  Schedule carotid US to follow up on plaque in neck arteries  Follow up in 6 months for annual wellness

## 2025-05-20 NOTE — TELEPHONE ENCOUNTER
Upon review of the In Basket request we have noted this is a NON-VALUE BASED item. We are unable to complete this request. Our team has the capability to assist in retrieval, updating, and linking of the following items: Chlamydia, CRC Cologuard, CRC Colonoscopy, CRC CT Colonography, CRC FIT/FOBT(3), CRC Sigmoidoscopy, CT Lung Screening, DEXA Scan, Diabetic Eye Exam, Diabetic Foot Exam, Hemoglobin A1c, Hemoglobin (pediatrics), Hepatitis C, HIV (cannot retrieve), Immunizations, Lead, Mammogram, Medicare AWV, Pap Smear (HPV), and Urine Microalbumin.    Any additional questions or concerns should be emailed to the Practice Liaisons via the appropriate education email address, please do not reply via In Basket.    Thank you  Jose Morales MA   [facility]UE [facility]

## 2025-05-21 LAB
25(OH)D3 SERPL-MCNC: 50.4 NG/ML (ref 30–100)
ALBUMIN SERPL BCG-MCNC: 4.6 G/DL (ref 3.5–5)
ALP SERPL-CCNC: 124 U/L (ref 34–104)
ALT SERPL W P-5'-P-CCNC: 10 U/L (ref 7–52)
ANION GAP SERPL CALCULATED.3IONS-SCNC: 9 MMOL/L (ref 4–13)
AST SERPL W P-5'-P-CCNC: 13 U/L (ref 13–39)
BACTERIA UR QL AUTO: ABNORMAL /HPF
BASOPHILS # BLD AUTO: 0.04 THOUSANDS/ÂΜL (ref 0–0.1)
BASOPHILS NFR BLD AUTO: 1 % (ref 0–1)
BILIRUB SERPL-MCNC: 0.6 MG/DL (ref 0.2–1)
BILIRUB UR QL STRIP: NEGATIVE
BUN SERPL-MCNC: 17 MG/DL (ref 5–25)
CALCIUM SERPL-MCNC: 10 MG/DL (ref 8.4–10.2)
CHLORIDE SERPL-SCNC: 104 MMOL/L (ref 96–108)
CHOLEST SERPL-MCNC: 160 MG/DL (ref ?–200)
CLARITY UR: CLEAR
CO2 SERPL-SCNC: 27 MMOL/L (ref 21–32)
COLOR UR: YELLOW
CREAT SERPL-MCNC: 1.13 MG/DL (ref 0.6–1.3)
CREAT UR-MCNC: 123.9 MG/DL
CREAT UR-MCNC: 123.9 MG/DL
EOSINOPHIL # BLD AUTO: 0.15 THOUSAND/ÂΜL (ref 0–0.61)
EOSINOPHIL NFR BLD AUTO: 2 % (ref 0–6)
ERYTHROCYTE [DISTWIDTH] IN BLOOD BY AUTOMATED COUNT: 14.6 % (ref 11.6–15.1)
GFR SERPL CREATININE-BSD FRML MDRD: 46 ML/MIN/1.73SQ M
GLUCOSE P FAST SERPL-MCNC: 104 MG/DL (ref 65–99)
GLUCOSE UR STRIP-MCNC: NEGATIVE MG/DL
HCT VFR BLD AUTO: 40 % (ref 34.8–46.1)
HDLC SERPL-MCNC: 45 MG/DL
HGB BLD-MCNC: 12.6 G/DL (ref 11.5–15.4)
HGB UR QL STRIP.AUTO: NEGATIVE
IMM GRANULOCYTES # BLD AUTO: 0.04 THOUSAND/UL (ref 0–0.2)
IMM GRANULOCYTES NFR BLD AUTO: 1 % (ref 0–2)
KETONES UR STRIP-MCNC: NEGATIVE MG/DL
LDLC SERPL CALC-MCNC: 84 MG/DL (ref 0–100)
LEUKOCYTE ESTERASE UR QL STRIP: ABNORMAL
LYMPHOCYTES # BLD AUTO: 1.16 THOUSANDS/ÂΜL (ref 0.6–4.47)
LYMPHOCYTES NFR BLD AUTO: 18 % (ref 14–44)
MCH RBC QN AUTO: 29.9 PG (ref 26.8–34.3)
MCHC RBC AUTO-ENTMCNC: 31.5 G/DL (ref 31.4–37.4)
MCV RBC AUTO: 95 FL (ref 82–98)
MICROALBUMIN UR-MCNC: 431.9 MG/L
MICROALBUMIN/CREAT 24H UR: 349 MG/G CREATININE (ref 0–30)
MONOCYTES # BLD AUTO: 0.42 THOUSAND/ÂΜL (ref 0.17–1.22)
MONOCYTES NFR BLD AUTO: 7 % (ref 4–12)
NEUTROPHILS # BLD AUTO: 4.68 THOUSANDS/ÂΜL (ref 1.85–7.62)
NEUTS SEG NFR BLD AUTO: 71 % (ref 43–75)
NITRITE UR QL STRIP: NEGATIVE
NON-SQ EPI CELLS URNS QL MICRO: ABNORMAL /HPF
NRBC BLD AUTO-RTO: 0 /100 WBCS
PH UR STRIP.AUTO: 6 [PH]
PLATELET # BLD AUTO: 240 THOUSANDS/UL (ref 149–390)
PMV BLD AUTO: 10.1 FL (ref 8.9–12.7)
POTASSIUM SERPL-SCNC: 5 MMOL/L (ref 3.5–5.3)
PROT SERPL-MCNC: 8.3 G/DL (ref 6.4–8.4)
PROT UR STRIP-MCNC: ABNORMAL MG/DL
PROT UR-MCNC: 77.4 MG/DL
PROT/CREAT UR: 0.6 MG/G{CREAT}
RBC # BLD AUTO: 4.22 MILLION/UL (ref 3.81–5.12)
RBC #/AREA URNS AUTO: ABNORMAL /HPF
SODIUM SERPL-SCNC: 140 MMOL/L (ref 135–147)
SP GR UR STRIP.AUTO: 1.02 (ref 1–1.03)
TRIGL SERPL-MCNC: 153 MG/DL (ref ?–150)
TSH SERPL DL<=0.05 MIU/L-ACNC: 3.19 UIU/ML (ref 0.45–4.5)
UROBILINOGEN UR STRIP-ACNC: <2 MG/DL
WBC # BLD AUTO: 6.49 THOUSAND/UL (ref 4.31–10.16)
WBC #/AREA URNS AUTO: ABNORMAL /HPF

## 2025-05-27 ENCOUNTER — RESULTS FOLLOW-UP (OUTPATIENT)
Dept: FAMILY MEDICINE CLINIC | Facility: CLINIC | Age: 80
End: 2025-05-27

## 2025-05-30 DIAGNOSIS — I10 PRIMARY HYPERTENSION: ICD-10-CM

## 2025-05-30 DIAGNOSIS — E03.8 OTHER SPECIFIED HYPOTHYROIDISM: ICD-10-CM

## 2025-05-30 DIAGNOSIS — N39.46 MIXED STRESS AND URGE URINARY INCONTINENCE: ICD-10-CM

## 2025-05-30 DIAGNOSIS — E78.00 ELEVATED CHOLESTEROL: ICD-10-CM

## 2025-05-30 DIAGNOSIS — I63.9 CEREBROVASCULAR ACCIDENT (CVA), UNSPECIFIED MECHANISM (HCC): ICD-10-CM

## 2025-05-30 RX ORDER — METOPROLOL SUCCINATE 25 MG/1
25 TABLET, EXTENDED RELEASE ORAL DAILY
Qty: 90 TABLET | Refills: 1 | Status: SHIPPED | OUTPATIENT
Start: 2025-05-30

## 2025-05-30 RX ORDER — ATORVASTATIN CALCIUM 40 MG/1
40 TABLET, FILM COATED ORAL EVERY EVENING
Qty: 90 TABLET | Refills: 1 | Status: SHIPPED | OUTPATIENT
Start: 2025-05-30

## 2025-05-30 RX ORDER — SOLIFENACIN SUCCINATE 10 MG/1
10 TABLET, FILM COATED ORAL DAILY
Qty: 90 TABLET | Refills: 1 | Status: SHIPPED | OUTPATIENT
Start: 2025-05-30

## 2025-05-30 RX ORDER — AMLODIPINE BESYLATE 10 MG/1
10 TABLET ORAL DAILY
Qty: 90 TABLET | Refills: 3 | Status: SHIPPED | OUTPATIENT
Start: 2025-05-30

## 2025-05-30 RX ORDER — LEVOTHYROXINE SODIUM 88 UG/1
88 TABLET ORAL
Qty: 90 TABLET | Refills: 1 | Status: SHIPPED | OUTPATIENT
Start: 2025-05-30

## 2025-06-02 ENCOUNTER — PROCEDURE VISIT (OUTPATIENT)
Dept: OBGYN CLINIC | Facility: CLINIC | Age: 80
End: 2025-06-02
Payer: MEDICARE

## 2025-06-02 VITALS
WEIGHT: 145.8 LBS | DIASTOLIC BLOOD PRESSURE: 74 MMHG | HEIGHT: 64 IN | BODY MASS INDEX: 24.89 KG/M2 | SYSTOLIC BLOOD PRESSURE: 118 MMHG

## 2025-06-02 DIAGNOSIS — Z46.89 PESSARY MAINTENANCE: Primary | ICD-10-CM

## 2025-06-02 PROCEDURE — 99213 OFFICE O/P EST LOW 20 MIN: CPT | Performed by: ADVANCED PRACTICE MIDWIFE

## 2025-06-02 NOTE — PROGRESS NOTES
"Pessary    Date/Time: 6/2/2025 10:30 AM    Performed by: Rebeca Castro CNM  Authorized by: Rebeca Castro CNM    [place] Protocol:  procedure performed by consultantConsent: Verbal consent obtained  Risks and benefits: risks, benefits and alternatives were discussed  Consent given by: patient  Time out: Immediately prior to procedure a \"time out\" was called to verify the correct patient, procedure, equipment, support staff and site/side marked as required.  Patient understanding: patient states understanding of the procedure being performed  Patient consent: the patient's understanding of the procedure matches consent given  Procedure consent: procedure consent matches procedure scheduled  Relevant documents: relevant documents present and verified  Required items: required blood products, implants, devices, and special equipment available  Patient identity confirmed: verbally with patient    Indication:     Indication for pessary: uterine prolapse and cystocele    Pre-procedure:     Pessary procedure type:  Cleaning/check  Problems:     Pessary complications: none    Procedure:     Pessary type:  Gellhorn flexible    Pessary size:  4    Patient tolerance of procedure:  Tolerated well, no immediate complications  Comments:     Procedure comments:  Spec exam- negative erythema, lesions, or discharge    "

## 2025-06-04 ENCOUNTER — HOSPITAL ENCOUNTER (OUTPATIENT)
Dept: NON INVASIVE DIAGNOSTICS | Facility: HOSPITAL | Age: 80
Discharge: HOME | End: 2025-06-04
Attending: INTERNAL MEDICINE
Payer: MEDICARE

## 2025-06-04 DIAGNOSIS — I63.9 CEREBROVASCULAR ACCIDENT (CVA), UNSPECIFIED MECHANISM (HCC): ICD-10-CM

## 2025-06-04 DIAGNOSIS — N18.31 STAGE 3A CHRONIC KIDNEY DISEASE (HCC): ICD-10-CM

## 2025-06-04 DIAGNOSIS — I65.23 BILATERAL CAROTID ARTERY STENOSIS: ICD-10-CM

## 2025-06-04 PROCEDURE — 93880 EXTRACRANIAL BILAT STUDY: CPT | Performed by: SURGERY

## 2025-06-04 PROCEDURE — 93880 EXTRACRANIAL BILAT STUDY: CPT
